# Patient Record
Sex: FEMALE | Race: WHITE | Employment: OTHER | URBAN - METROPOLITAN AREA
[De-identification: names, ages, dates, MRNs, and addresses within clinical notes are randomized per-mention and may not be internally consistent; named-entity substitution may affect disease eponyms.]

---

## 2018-03-28 ENCOUNTER — OFFICE VISIT (OUTPATIENT)
Dept: FAMILY MEDICINE CLINIC | Age: 63
End: 2018-03-28

## 2018-03-28 VITALS
WEIGHT: 149 LBS | HEART RATE: 56 BPM | HEIGHT: 65 IN | OXYGEN SATURATION: 97 % | TEMPERATURE: 97.2 F | RESPIRATION RATE: 16 BRPM | BODY MASS INDEX: 24.83 KG/M2 | DIASTOLIC BLOOD PRESSURE: 53 MMHG | SYSTOLIC BLOOD PRESSURE: 117 MMHG

## 2018-03-28 DIAGNOSIS — Z01.89 ROUTINE LAB DRAW: ICD-10-CM

## 2018-03-28 DIAGNOSIS — E78.5 HYPERLIPIDEMIA, UNSPECIFIED HYPERLIPIDEMIA TYPE: ICD-10-CM

## 2018-03-28 DIAGNOSIS — I10 ESSENTIAL HYPERTENSION: Primary | ICD-10-CM

## 2018-03-28 DIAGNOSIS — I73.9 PAD (PERIPHERAL ARTERY DISEASE) (HCC): ICD-10-CM

## 2018-03-28 DIAGNOSIS — E11.9 CONTROLLED TYPE 2 DIABETES MELLITUS WITHOUT COMPLICATION, WITHOUT LONG-TERM CURRENT USE OF INSULIN (HCC): ICD-10-CM

## 2018-03-28 DIAGNOSIS — I65.23 BILATERAL CAROTID ARTERY STENOSIS: ICD-10-CM

## 2018-03-28 DIAGNOSIS — M06.9 RHEUMATOID ARTHRITIS OF WRIST, UNSPECIFIED LATERALITY, UNSPECIFIED RHEUMATOID FACTOR PRESENCE: ICD-10-CM

## 2018-03-28 DIAGNOSIS — Z12.11 COLON CANCER SCREENING: ICD-10-CM

## 2018-03-28 DIAGNOSIS — Z95.828 S/P INSERTION OF ILIAC ARTERY STENT: ICD-10-CM

## 2018-03-28 DIAGNOSIS — H26.9 CATARACT OF BOTH EYES, UNSPECIFIED CATARACT TYPE: ICD-10-CM

## 2018-03-28 RX ORDER — PREDNISONE 10 MG/1
TABLET ORAL
COMMUNITY
End: 2018-03-28 | Stop reason: SDUPTHER

## 2018-03-28 RX ORDER — METHOTREXATE 2.5 MG/1
15 TABLET ORAL
COMMUNITY
End: 2018-03-28 | Stop reason: SDUPTHER

## 2018-03-28 RX ORDER — ATENOLOL 25 MG/1
25 TABLET ORAL DAILY
COMMUNITY
End: 2018-03-28

## 2018-03-28 RX ORDER — HYDROXYCHLOROQUINE SULFATE 200 MG/1
200 TABLET, FILM COATED ORAL 2 TIMES DAILY
Qty: 60 TAB | Refills: 2 | Status: SHIPPED | OUTPATIENT
Start: 2018-03-28 | End: 2018-06-27

## 2018-03-28 RX ORDER — HYDRALAZINE HYDROCHLORIDE 100 MG/1
100 TABLET, FILM COATED ORAL 3 TIMES DAILY
COMMUNITY
End: 2018-03-28 | Stop reason: SDUPTHER

## 2018-03-28 RX ORDER — ATORVASTATIN CALCIUM 20 MG/1
20 TABLET, FILM COATED ORAL 2 TIMES DAILY
COMMUNITY
End: 2018-04-02

## 2018-03-28 RX ORDER — HYDROXYCHLOROQUINE SULFATE 200 MG/1
200 TABLET, FILM COATED ORAL 2 TIMES DAILY
COMMUNITY
End: 2018-03-28 | Stop reason: SDUPTHER

## 2018-03-28 RX ORDER — AMLODIPINE BESYLATE 5 MG/1
5 TABLET ORAL DAILY
COMMUNITY
End: 2018-04-25 | Stop reason: SDUPTHER

## 2018-03-28 RX ORDER — HYDRALAZINE HYDROCHLORIDE 100 MG/1
100 TABLET, FILM COATED ORAL 3 TIMES DAILY
Qty: 90 TAB | Refills: 2 | Status: SHIPPED | OUTPATIENT
Start: 2018-03-28 | End: 2018-04-25 | Stop reason: SDUPTHER

## 2018-03-28 RX ORDER — BISMUTH SUBSALICYLATE 262 MG
1 TABLET,CHEWABLE ORAL DAILY
COMMUNITY
End: 2019-02-05

## 2018-03-28 RX ORDER — VERAPAMIL HYDROCHLORIDE 180 MG/1
180 TABLET, EXTENDED RELEASE ORAL
COMMUNITY
End: 2018-04-25 | Stop reason: SDUPTHER

## 2018-03-28 RX ORDER — METHOTREXATE 2.5 MG/1
15 TABLET ORAL
Qty: 24 TAB | Refills: 2 | Status: SHIPPED | OUTPATIENT
Start: 2018-04-01 | End: 2018-06-30

## 2018-03-28 RX ORDER — LISINOPRIL 20 MG/1
20 TABLET ORAL DAILY
COMMUNITY
End: 2018-04-25 | Stop reason: SDUPTHER

## 2018-03-28 RX ORDER — ASPIRIN 325 MG
325 TABLET ORAL DAILY
COMMUNITY
End: 2019-02-05

## 2018-03-28 RX ORDER — PREDNISONE 10 MG/1
10 TABLET ORAL
Qty: 30 TAB | Refills: 2 | Status: SHIPPED | OUTPATIENT
Start: 2018-03-28 | End: 2018-06-27

## 2018-03-28 NOTE — MR AVS SNAPSHOT
[address] [place] [address] [address] Anitra Winston [address] 
[phone] Patient: Annamarie Palacio MRN: [id number] :1955 Visit Information Date & Time Provider Department Dept. Phone Encounter #  
 3/28/2018 11:30 AM Erik Nguyen MD [address] [phone] [id number] Follow-up Instructions Return in about 4 weeks (around 2018), or if symptoms worsen or fail to improve, for HTN. Upcoming Health Maintenance Date Due Hepatitis C Screening 1955 COLONOSCOPY 10/19/1971 DTaP/Tdap/Td series (1 - Tdap) 10/19/1974 ZOSTER VACCINE AGE 60> 2013 BREAST CANCER SCRN MAMMOGRAM 2019 Allergies as of 3/28/2018  Review Complete On: 3/28/2018 By: Erik Nguyen MD  
  
 Severity Noted Reaction Type Reactions Hydrochlorothiazide  2018    Other (comments) LOW SODIUM Penicillin G  2018    Hives, Rash  
 Sulfa (Sulfonamide Antibiotics)  2018    Hives, Rash Current Immunizations  Never Reviewed No immunizations on file. Not reviewed this visit You Were Diagnosed With   
  
 Codes Comments Essential hypertension    -  Primary ICD-10-CM: I10 
ICD-9-CM: 401.9 Bilateral carotid artery stenosis     ICD-10-CM: I65.23 ICD-9-CM: 433.10, 433.30 PAD (peripheral artery disease) (HCC)     ICD-10-CM: I73.9 ICD-9-CM: 443.9 Colon cancer screening     ICD-10-CM: Z12.11 ICD-9-CM: V76.51 S/P insertion of iliac artery stent     ICD-10-CM: Z95.828 ICD-9-CM: V45.89 Rheumatoid arthritis of wrist, unspecified laterality, unspecified rheumatoid factor presence (HCC)     ICD-10-CM: M06.9 ICD-9-CM: 714.0 Hyperlipidemia, unspecified hyperlipidemia type     ICD-10-CM: E78.5 ICD-9-CM: 272.4 Cataract of both eyes, unspecified cataract type     ICD-10-CM: H26.9 ICD-9-CM: 366.9  Controlled type 2 diabetes mellitus without complication, without long-term current use of insulin (HCC)     ICD-10-CM: E11.9 ICD-9-CM: 250.00 Vitals BP Pulse Temp Resp Height(growth percentile) Weight(growth percentile) 117/53 (BP 1 Location: Right arm) (!) 56 97.2 °F (36.2 °C) 16 5' 5\" (1.651 m) 149 lb (67.6 kg) SpO2 BMI OB Status Smoking Status 97% 24.79 kg/m2 Postmenopausal Current Every Day Smoker Vitals History BMI and BSA Data Body Mass Index Body Surface Area 24.79 kg/m 2 1.76 m 2 Preferred Pharmacy Pharmacy Name Phone [facility] [address] [facility], [address] [phone] Your Updated Medication List  
  
   
This list is accurate as of 3/28/18 12:20 PM.  Always use your most recent med list. amLODIPine 5 mg tablet Commonly known as:  Larayne Sangeetha Take 5 mg by mouth daily. aspirin 325 mg tablet Commonly known as:  ASPIRIN Take 325 mg by mouth daily. atorvastatin 20 mg tablet Commonly known as:  LIPITOR Take 20 mg by mouth two (2) times a day. calcium carbonate-vitamin D3 600 mg (1,500 mg)-800 unit [place] Quarto Take  by mouth. FOLIC ACID PO Take 400 mcg by mouth. hydrALAZINE 100 mg tablet Commonly known as:  APRESOLINE Take 1 Tab by mouth three (3) times daily for 90 days. hydroxychloroquine 200 mg tablet Commonly known as:  PLAQUENIL Take 1 Tab by mouth two (2) times a day for 90 days. lisinopril 20 mg tablet Commonly known as:  Queen Cori Take 20 mg by mouth two (2) times a day. methotrexate 2.5 mg tablet Commonly known as:  Lewis Later Take 6 Tabs by mouth every Sunday for 90 days. Start taking on:  4/1/2018  
  
 multivitamin tablet Commonly known as:  ONE A DAY Take 1 Tab by mouth daily. predniSONE 10 mg tablet Commonly known as:  Lily Mellow Take 1 Tab by mouth daily as needed for up to 90 days. verapamil  mg CR tablet Commonly known as:  CALAN-SR  
 Take 180 mg by mouth two (2) times a day. zinc 50 mg Tab tablet Take  by mouth daily. Prescriptions Sent to Pharmacy Refills  
 hydroxychloroquine (PLAQUENIL) 200 mg tablet 2 Sig: Take 1 Tab by mouth two (2) times a day for 90 days. Class: Normal  
 Pharmacy: [address] #: [phone] Route: Oral  
 predniSONE (DELTASONE) 10 mg tablet 2 Sig: Take 1 Tab by mouth daily as needed for up to 90 days. Class: Normal  
 Pharmacy: [address] #: [phone] Route: Oral  
 methotrexate (RHEUMATREX) 2.5 mg tablet 2 Starting on: 2018 Sig: Take 6 Tabs by mouth every  for 90 days. Class: Normal  
 Pharmacy: [address] #: [phone] Route: Oral  
 hydrALAZINE (APRESOLINE) 100 mg tablet 2 Sig: Take 1 Tab by mouth three (3) times daily for 90 days. Class: Normal  
 Pharmacy: [address] #: [phone] Route: Oral  
  
We Performed the Following REFERRAL TO GASTROENTEROLOGY [XWA17 Custom] Comments:  
 Please evaluate patient for colon cancer screen with colonoscopy, fam hx adenoma polpys in daughter ONE Change. REFERRAL TO OPHTHALMOLOGY [REF57 Custom] Comments:  
 Please evaluate patient for bilateral cataracts ONE Change. REFERRAL TO RHEUMATOLOGY [ISY39 Custom] REFERRAL TO VASCULAR SURGERY [YPG965 Custom] Comments: S/p  and fem-pop bypass, stents. Please eval and give treatment recs ONE Change Follow-up Instructions Return in about 4 weeks (around 2018), or if symptoms worsen or fail to improve, for HTN. To-Do List   
 2018 Lab:  CBC WITH AUTOMATED DIFF   
  
 2018 Lab:  HEMOGLOBIN A1C WITH EAG   
  
 2018 Lab:  LIPID PANEL   
  
 2018 Lab: METABOLIC PANEL, COMPREHENSIVE   
  
 03/28/2018 Lab:  MICROALBUMIN, UR, RAND W/ MICROALB/CREAT RATIO Referral Information Referral ID Referred By Referred To  
  
 [id number] Rex Vallejo Not Available Visits Status Start Date End Date 1 New Request 3/28/18 3/28/19 If your referral has a status of pending review or denied, additional information will be sent to support the outcome of this decision. Referral ID Referred By Referred To  
 [id number] Rex Vallejo Not Available Visits Status Start Date End Date 1 New Request 3/28/18 3/28/19 If your referral has a status of pending review or denied, additional information will be sent to support the outcome of this decision. Referral ID Referred By Referred To  
 [id number] Rex Valeljo Not Available Visits Status Start Date End Date 1 New Request 3/28/18 3/28/19 If your referral has a status of pending review or denied, additional information will be sent to support the outcome of this decision. Referral ID Referred By Referred To  
 [id number] Rex Vallejo Not Available Visits Status Start Date End Date 1 New Request 3/28/18 3/28/19 If your referral has a status of pending review or denied, additional information will be sent to support the outcome of this decision. Patient Instructions Stop taking atenolol Start taking verapamil and lisinopril once a day in morning Continue hydralazine 100 mg three times a day, amlodipine 5 mg daily, Check BP mid afternoon, 2-3 PM after sitting for a few minutes, legs uncrossed, back resting Write numbers down an                                                                                                                                                                                                               d bring to clinic in 4 weeks. If persistently >140/>90 call clinic to discuss If persistently <120/<60 call clinic to discuss We are sending a referral to rheumatology, vascular surgery, eye doctor and gastroenterology for colon cancer screen . You should be called about this in 2-3 weeks. If no word in 3 weeks please give us a call to follow up We will let you know the results of your blood and urine test when they come in. We will call if abnormal and send a letter if normal. 
 
 
 
  
Introducing [facility]! [facility] introduces [employer] patient portal. Now you can access parts of your medical record, email your doctor's office, and request medication refills online. 1. In your internet browser, go to [email or web address] 2. Click on the First Time User? Click Here link in the Sign In box. You will see the New Member Sign Up page. 3. Enter your [employer] Access Code exactly as it appears below. You will not need to use this code after youve completed the sign-up process. If you do not sign up before the expiration date, you must request a new code. · [employer] Access Code: [email or web address] Expires: 6/26/2018 12:19 PM 
 
4. Enter the last four digits of your Social Security Number (xxxx) and Date of Birth (mm/dd/yyyy) as indicated and click Submit. You will be taken to the next sign-up page. 5. Create a [employer] ID. This will be your [employer] login ID and cannot be changed, so think of one that is secure and easy to remember. 6. Create a [employer] password. You can change your password at any time. 7. Enter your Password Reset Question and Answer. This can be used at a later time if you forget your password. 8. Enter your e-mail address. You will receive e-mail notification when new information is available in [address] 9. Click Sign Up. You can now view and download portions of your medical record. 10. Click the Download Summary menu link to download a portable copy of your medical information.  
 
If you have questions, please visit the Frequently Asked Questions section of the [employer]. Remember, [facility] is NOT to be used for urgent needs. For medical emergencies, dial 911. Now available from your iPhone and Android! Please provide this summary of care documentation to your next provider. If you have any questions after today's visit, please call [phone].

## 2018-03-28 NOTE — PATIENT INSTRUCTIONS
Stop taking atenolol  Start taking verapamil and lisinopril once a day in morning  Continue hydralazine 100 mg three times a day, amlodipine 5 mg daily,   Check BP mid afternoon, 2-3 PM after sitting for a few minutes, legs uncrossed, back resting  Write numbers down an                                                                                                                                                                                                               d bring to clinic in 4 weeks. If persistently >140/>90 call clinic to discuss  If persistently <120/<60 call clinic to discuss    We are sending a referral to rheumatology, vascular surgery, eye doctor and gastroenterology for colon cancer screen . You should be called about this in 2-3 weeks. If no word in 3 weeks please give us a call to follow up    We will let you know the results of your blood and urine test when they come in.  We will call if abnormal and send a letter if normal.

## 2018-03-28 NOTE — PROGRESS NOTES
INTERNAL MEDICINE INITIAL PROVIDER VISIT    SUBJECTIVE:     Chief Complaint   Patient presents with    Establish Care    Hypertension       HPI: 57 y.o. female with PMHx significant for CAD, HTN, DM and HLD is here for the above chief complaint(s). Establish Care: last PCP with [facility] in [facility]. Hypertension: Today BP is controlled. Taking atenolol 25 mg daily, hydralazine 100mg TID, varapamil  mg BID, amlodipine 5 mg daily, lisinopril 20mg BID. No side effects from medications. 20 lb weight loss in past few months. Ever once in a while feels lightheaded. Denies headache, lightheadedness, dizziness, vision changes, chest pain, rapid/irregular heart rate, shortness of breath, cough, abdominal pain, leg pain. Left leg minimal leg swelling. Does not check BP outside of office. Would like to decrease BP medicines. HLD: Atorvastatin 20 mg daily. Rheumatoid arthitis: ologist Dr. Youssef Falguni: Taking methotrexate 2.5 mg once a week and plaquenil 200 mg BID, prednisone as needed 10 mg daily. PAD s/p  and fem/pop bypass and iliac stent, femoral stent, popliteal stents in past. Left foot numb since femoral bypass. Anxiety: Taking xanax 0.25 mg as needed hasn't taken in months., last filled 2017    ROS: 12 point ROS completed positive per HPI and easy bruising. Current Outpatient Prescriptions   Medication Sig    amLODIPine (NORVASC) 5 mg tablet Take 5 mg by mouth daily.  atorvastatin (LIPITOR) 20 mg tablet Take 20 mg by mouth two (2) times a day.  verapamil ER (CALAN-SR) 180 mg CR tablet Take 180 mg by mouth two (2) times a day.  lisinopril (PRINIVIL, ZESTRIL) 20 mg tablet Take 20 mg by mouth two (2) times a day.  zinc 50 mg tab tablet Take  by mouth daily.  FOLIC ACID PO Take 539 mcg by mouth.  multivitamin (ONE A DAY) tablet Take 1 Tab by mouth daily.  aspirin (ASPIRIN) 325 mg tablet Take 325 mg by mouth daily.     calcium carbonate-vitamin D3 600 mg (1,500 mg)-800 unit chew Take  by mouth.  hydroxychloroquine (PLAQUENIL) 200 mg tablet Take 1 Tab by mouth two (2) times a day for 90 days.  predniSONE (DELTASONE) 10 mg tablet Take 1 Tab by mouth daily as needed for up to 90 days.  [START ON 2018] methotrexate (RHEUMATREX) 2.5 mg tablet Take 6 Tabs by mouth every  for 90 days.  hydrALAZINE (APRESOLINE) 100 mg tablet Take 1 Tab by mouth three (3) times daily for 90 days. No current facility-administered medications for this visit. Health Maintenance   Topic Date Due    Hepatitis C Screening  1955    COLONOSCOPY  10/19/1971    DTaP/Tdap/Td series (1 - Tdap) 10/19/1974    ZOSTER VACCINE AGE 60>  2013    BREAST CANCER SCRN MAMMOGRAM  2019    Influenza Age 9 to Adult  Completed       Medications and Allergies: Reviewed and confirmed in the chart    Past Medical Hx: Reviewed and confirmed in the chart  Past Medical History:   Diagnosis Date    Arthritis     Carotid artery stenosis       LEFT ARTERY    Cataract     Diabetes type 2, controlled ([address]     HLD (hyperlipidemia)     Hypertension     PAD (peripheral artery disease) ([address]     FEM--POP BYPASS  LEFT FEM BYPASS RIGHT FEMORAL ENDARTERECTOMY, LEFT FEM STENT      Rheumatoid arthritis ([address]     S/P insertion of iliac artery stent 2016       Family Hx, Surgical Hx, Social Hx: Reviewed and updated in EMR    OBJECTIVE:  Vitals:    18 1136   BP: 117/53   Pulse: (!) 56   Resp: 16   Temp: 97.2 °F (36.2 °C)   SpO2: 97%   Weight: 149 lb (67.6 kg)   Height: 5' 5\" (1.651 m)       BP Readings from Last 3 Encounters:   18 117/53     Wt Readings from Last 3 Encounters:   18 149 lb (67.6 kg)       General: Pleasant elderly woman in no acute distress  HEENT: Head is atraumatic normo-cephalic. Neck: Supple, no LAD  CVS: + carotid bruit right, no bruit on left. Heart regular, rate, and rhythm. Audible S1 and S2.  No murmurs, rubs or gallops. PULM: Lungs clear to auscultation bilaterally. No wheezes, rales or rhonchi. GI: Positive bowel sounds, soft, nondistended, non-tender. EXT: 2+ dorsalis pedis and posterior tibialis pulses bilaterally. No pedal edema bilaterally  Neuro: Alert and oriented x3. Gait wnl. .   MSE: Mood euthymic, affect congruent and reactive. Nursing Notes Reviewed    ASSESSMENT AND PLAN    ICD-10-CM ICD-9-CM    1. Essential hypertension I10 401.9 hydrALAZINE (APRESOLINE) 100 mg tablet  D/c atenolol  BP log  Dash diet   2. Bilateral carotid artery stenosis I65.23 433.10 Vascular referral  NAE for past MD     433.30    3. PAD (peripheral artery disease) ([facility]) I73.9 443.9 REFERRAL TO VASCULAR SURGERY      LIPID PANEL      CBC WITH AUTOMATED DIFF   4. Colon cancer screening Z12.11 V76.51 REFERRAL TO GASTROENTEROLOGY   5. S/P insertion of iliac artery stent V26.794 V45.89 vascular referral   6. Rheumatoid arthritis of wrist, unspecified laterality, unspecified rheumatoid factor presence ([facility]) M06.9 714.0 REFERRAL TO OPHTHALMOLOGY  nae signed for past rheumatologist      REFERRAL TO RHEUMATOLOGY      CBC WITH AUTOMATED DIFF      hydroxychloroquine (PLAQUENIL) 200 mg tablet      predniSONE (DELTASONE) 10 mg tablet      methotrexate (RHEUMATREX) 2.5 mg tablet 15 mg every Sunday continue   7. Hyperlipidemia, unspecified hyperlipidemia type E78.5 272.4 LIPID PANEL   8. Cataract of both eyes, unspecified cataract type H26.9 366.9 REFERRAL TO OPHTHALMOLOGY   9.  Controlled type 2 diabetes mellitus without complication, without long-term current use of insulin ([facility]) diet controlled E11.9 250.00 HEMOGLOBIN A1C WITH EAG        METABOLIC PANEL, COMPREHENSIVE      CBC WITH AUTOMATED DIFF      MICROALBUMIN, UR, RAND W/ MICROALB/CREAT RATIO       Orders Placed This Encounter    HEMOGLOBIN A1C WITH EAG    METABOLIC PANEL, COMPREHENSIVE    LIPID PANEL    CBC WITH AUTOMATED DIFF    MICROALBUMIN, UR, RAND W/ MICROALB/CREAT RATIO    REFERRAL TO GASTROENTEROLOGY    REFERRAL TO OPHTHALMOLOGY    REFERRAL TO RHEUMATOLOGY    REFERRAL TO VASCULAR SURGERY    amLODIPine (NORVASC) 5 mg tablet    DISCONTD: hydrALAZINE (APRESOLINE) 100 mg tablet    atorvastatin (LIPITOR) 20 mg tablet    DISCONTD: hydroxychloroquine (PLAQUENIL) 200 mg tablet    verapamil ER (CALAN-SR) 180 mg CR tablet    lisinopril (PRINIVIL, ZESTRIL) 20 mg tablet    DISCONTD: predniSONE (DELTASONE) 10 mg tablet    DISCONTD: methotrexate (RHEUMATREX) 2.5 mg tablet    DISCONTD: atenolol (TENORMIN) 25 mg tablet    zinc 50 mg tab tablet    FOLIC ACID PO    multivitamin (ONE A DAY) tablet    aspirin (ASPIRIN) 325 mg tablet    calcium carbonate-vitamin D3 600 mg (1,500 mg)-800 unit chew    hydroxychloroquine (PLAQUENIL) 200 mg tablet    predniSONE (DELTASONE) 10 mg tablet    methotrexate (RHEUMATREX) 2.5 mg tablet    hydrALAZINE (APRESOLINE) 100 mg tablet     4 WEEKS    AVS printed and provided to patient    Assessment and plan above discussed with patient, patient voiced understanding and agreement with plan. More than 50% of this 30 min visit was spent face to face counseling the patient about the etiology and treatment options for the above health conditions outlined in assessment and plan       Brandt Vale M.D.   [employer]  [address] [place] [address], [address] [place] [address] [phone]  [place] [phone]

## 2018-03-28 NOTE — PROGRESS NOTES
Chief Complaint   Patient presents with    Establish Care    Hypertension     1. Have you been to the ER, urgent care clinic since your last visit? Hospitalized since your last visit? No    2. Have you seen or consulted any other health care providers outside of the [address] [place] [address] since your last visit? Include any pap smears or colon screening.  No

## 2018-03-29 LAB
A-G RATIO,AGRAT: 2 RATIO (ref 1.1–2.6)
ABSOLUTE LYMPHOCYTE COUNT, 10803: 0.9 K/UL (ref 1–4.8)
ALBUMIN SERPL-MCNC: 4.3 G/DL (ref 3.5–5)
ALP SERPL-CCNC: 60 U/L (ref 40–120)
ALT SERPL-CCNC: 22 U/L (ref 5–40)
ANION GAP SERPL CALC-SCNC: 17 MMOL/L
AST SERPL W P-5'-P-CCNC: 27 U/L (ref 10–37)
AVG GLU, 10930: 92 MG/DL (ref 91–123)
BASOPHILS # BLD: 0 K/UL (ref 0–0.2)
BASOPHILS NFR BLD: 0 % (ref 0–2)
BILIRUB SERPL-MCNC: 1 MG/DL (ref 0.2–1.2)
BUN SERPL-MCNC: 11 MG/DL (ref 6–22)
CALCIUM SERPL-MCNC: 9.3 MG/DL (ref 8.4–10.5)
CHLORIDE SERPL-SCNC: 90 MMOL/L (ref 98–110)
CHOLEST SERPL-MCNC: 159 MG/DL (ref 110–200)
CO2 SERPL-SCNC: 24 MMOL/L (ref 20–32)
CREAT SERPL-MCNC: 0.7 MG/DL (ref 0.8–1.4)
CREATININE, URINE: 28 MG/DL
EOSINOPHIL # BLD: 0 K/UL (ref 0–0.5)
EOSINOPHIL NFR BLD: 0 % (ref 0–6)
ERYTHROCYTE [DISTWIDTH] IN BLOOD BY AUTOMATED COUNT: 15 % (ref 10–15.5)
GFRAA, 66117: >60
GFRNA, 66118: >60
GLOBULIN,GLOB: 2.1 G/DL (ref 2–4)
GLUCOSE SERPL-MCNC: 74 MG/DL (ref 70–99)
GRANULOCYTES,GRANS: 75 % (ref 40–75)
HBA1C MFR BLD HPLC: 4.8 % (ref 4.8–5.9)
HCT VFR BLD AUTO: 35.5 % (ref 35.1–48)
HDLC SERPL-MCNC: 1.6 MG/DL (ref 0–5)
HDLC SERPL-MCNC: 97 MG/DL (ref 40–59)
HGB BLD-MCNC: 11.9 G/DL (ref 11.7–16)
LDLC SERPL CALC-MCNC: 36 MG/DL (ref 50–99)
LYMPHOCYTES, LYMLT: 18 % (ref 20–45)
MCH RBC QN AUTO: 36 PG (ref 26–34)
MCHC RBC AUTO-ENTMCNC: 34 G/DL (ref 31–36)
MCV RBC AUTO: 106 FL (ref 80–95)
MICROALB/CREAT RATIO, 140286: NORMAL MCG/MG OF CREATININE (ref 0–30)
MICROALBUMIN,URINE RANDOM 140054: <12 UG/ML (ref 0.1–17)
MONOCYTES # BLD: 0.3 K/UL (ref 0.1–1)
MONOCYTES NFR BLD: 7 % (ref 3–12)
NEUTROPHILS # BLD AUTO: 3.7 K/UL (ref 1.8–7.7)
PLATELET # BLD AUTO: 171 K/UL (ref 140–440)
PMV BLD AUTO: 9.8 FL (ref 9–13)
POTASSIUM SERPL-SCNC: 3.8 MMOL/L (ref 3.5–5.5)
PROT SERPL-MCNC: 6.4 G/DL (ref 6.2–8.1)
RBC # BLD AUTO: 3.34 M/UL (ref 3.8–5.2)
SODIUM SERPL-SCNC: 131 MMOL/L (ref 133–145)
TRIGL SERPL-MCNC: 128 MG/DL (ref 40–149)
VLDLC SERPL CALC-MCNC: 26 MG/DL (ref 8–30)
WBC # BLD AUTO: 5 K/UL (ref 4–11)

## 2018-04-02 ENCOUNTER — TELEPHONE (OUTPATIENT)
Dept: FAMILY MEDICINE CLINIC | Age: 63
End: 2018-04-02

## 2018-04-02 DIAGNOSIS — E87.1 HYPONATREMIA: ICD-10-CM

## 2018-04-02 DIAGNOSIS — E87.8 HYPOCHLOREMIA: ICD-10-CM

## 2018-04-02 DIAGNOSIS — E78.5 HYPERLIPIDEMIA, UNSPECIFIED HYPERLIPIDEMIA TYPE: Primary | ICD-10-CM

## 2018-04-02 RX ORDER — ATORVASTATIN CALCIUM 10 MG/1
10 TABLET, FILM COATED ORAL DAILY
Qty: 30 TAB | Refills: 2 | Status: SHIPPED | OUTPATIENT
Start: 2018-04-02 | End: 2018-04-25 | Stop reason: SDUPTHER

## 2018-04-02 NOTE — TELEPHONE ENCOUNTER
Called to discuss lab results. Low Sodium/Low chloride: 50-60 ounces per day water. 1 cup of coffee per day. No tremors, restlessness, vomiting or diarrhea. No increased or change in urination. Increase water 80 ounces per day. Will get urine and serum testing for further evaluation. LDL 36. Decrease your atorvastatin to 10 mg daily,  Recheck lipids in 3 months. Agueda Crouch M.D.   [address], [address] [place] [address] [phone]  [place] [phone]

## 2018-04-03 ENCOUNTER — LAB ONLY (OUTPATIENT)
Dept: FAMILY MEDICINE CLINIC | Age: 63
End: 2018-04-03

## 2018-04-03 DIAGNOSIS — Z01.89 ROUTINE LAB DRAW: Primary | ICD-10-CM

## 2018-04-03 NOTE — PROGRESS NOTES
1 lav and 1 sst along with urine collected today. Labs will be sent to [facility].

## 2018-04-03 NOTE — TELEPHONE ENCOUNTER
Patient was here today and urine along with blood work collected at time of labs. All labs will be sentt to [facility] based on patient's insurance. 1 urine cup, 1 lav and 1sst drawn.

## 2018-04-04 LAB
ANION GAP SERPL CALC-SCNC: 18 MMOL/L
AVG GLU, 10930: 91 MG/DL (ref 91–123)
BUN SERPL-MCNC: 8 MG/DL (ref 6–22)
CALCIUM SERPL-MCNC: 9.3 MG/DL (ref 8.4–10.5)
CHLORIDE SERPL-SCNC: 93 MMOL/L (ref 98–110)
CO2 SERPL-SCNC: 23 MMOL/L (ref 20–32)
CREAT SERPL-MCNC: 0.7 MG/DL (ref 0.8–1.4)
GFRAA, 66117: >60
GFRNA, 66118: >60
GLUCOSE SERPL-MCNC: 81 MG/DL (ref 70–99)
HBA1C MFR BLD HPLC: 4.8 % (ref 4.8–5.9)
Lab: 265 MOS/KG (ref 280–300)
Lab: 268 MOS/KG (ref 200–1200)
Lab: 68 MMOL/L
POTASSIUM SERPL-SCNC: 3.9 MMOL/L (ref 3.5–5.5)
SODIUM SERPL-SCNC: 134 MMOL/L (ref 133–145)
T4 FREE SERPL-MCNC: 1.2 NG/DL (ref 0.9–1.8)
TSH SERPL DL<=0.005 MIU/L-ACNC: 1.3 MCU/ML (ref 0.27–4.2)

## 2018-04-06 ENCOUNTER — TELEPHONE (OUTPATIENT)
Dept: FAMILY MEDICINE CLINIC | Age: 63
End: 2018-04-06

## 2018-04-06 PROBLEM — E87.1 HYPONATREMIA: Status: RESOLVED | Noted: 2018-04-02 | Resolved: 2018-04-06

## 2018-04-06 NOTE — TELEPHONE ENCOUNTER
Results of testing significant for resolved low sodium, improved low chloride. Serum osm low. Suspect poor nutrition food and water intake with A1c 4.8. Pt reports increased water intake. 1 meal a day usually lunch, 1 protein drink every morning. Low appetite since moving to [place]. Lost 22 lb since November 2017. Home sick, difficulty with [facility]. Will discuss next visit. Torsten Ro M.D.   [address], [address] [place] [address], [address] [place] [address] [phone]  [facility] [phone]

## 2018-04-25 ENCOUNTER — OFFICE VISIT (OUTPATIENT)
Dept: FAMILY MEDICINE CLINIC | Age: 63
End: 2018-04-25

## 2018-04-25 VITALS
SYSTOLIC BLOOD PRESSURE: 133 MMHG | RESPIRATION RATE: 16 BRPM | TEMPERATURE: 97.9 F | DIASTOLIC BLOOD PRESSURE: 54 MMHG | HEIGHT: 65 IN | WEIGHT: 147.4 LBS | BODY MASS INDEX: 24.56 KG/M2 | HEART RATE: 79 BPM | OXYGEN SATURATION: 99 %

## 2018-04-25 DIAGNOSIS — H26.9 CATARACT OF BOTH EYES, UNSPECIFIED CATARACT TYPE: ICD-10-CM

## 2018-04-25 DIAGNOSIS — E78.5 HYPERLIPIDEMIA, UNSPECIFIED HYPERLIPIDEMIA TYPE: ICD-10-CM

## 2018-04-25 DIAGNOSIS — I73.9 PAD (PERIPHERAL ARTERY DISEASE) (HCC): ICD-10-CM

## 2018-04-25 DIAGNOSIS — M06.9 RHEUMATOID ARTHRITIS OF WRIST, UNSPECIFIED LATERALITY, UNSPECIFIED RHEUMATOID FACTOR PRESENCE: ICD-10-CM

## 2018-04-25 DIAGNOSIS — E11.42 DIABETIC POLYNEUROPATHY ASSOCIATED WITH TYPE 2 DIABETES MELLITUS (HCC): ICD-10-CM

## 2018-04-25 DIAGNOSIS — I65.23 BILATERAL CAROTID ARTERY STENOSIS: ICD-10-CM

## 2018-04-25 DIAGNOSIS — E46 MALNUTRITION, UNSPECIFIED TYPE (HCC): ICD-10-CM

## 2018-04-25 DIAGNOSIS — E11.9 CONTROLLED TYPE 2 DIABETES MELLITUS WITHOUT COMPLICATION, WITHOUT LONG-TERM CURRENT USE OF INSULIN (HCC): ICD-10-CM

## 2018-04-25 DIAGNOSIS — I10 ESSENTIAL HYPERTENSION: Primary | ICD-10-CM

## 2018-04-25 RX ORDER — ATORVASTATIN CALCIUM 10 MG/1
10 TABLET, FILM COATED ORAL DAILY
Qty: 30 TAB | Refills: 2 | Status: SHIPPED | OUTPATIENT
Start: 2018-04-25 | End: 2018-09-16 | Stop reason: SDUPTHER

## 2018-04-25 RX ORDER — LISINOPRIL 20 MG/1
20 TABLET ORAL DAILY
Qty: 30 TAB | Refills: 5 | Status: SHIPPED | OUTPATIENT
Start: 2018-04-25 | End: 2018-09-20 | Stop reason: SDUPTHER

## 2018-04-25 RX ORDER — HYDRALAZINE HYDROCHLORIDE 100 MG/1
100 TABLET, FILM COATED ORAL 3 TIMES DAILY
Qty: 90 TAB | Refills: 2 | Status: SHIPPED | OUTPATIENT
Start: 2018-06-25 | End: 2018-09-16 | Stop reason: SDUPTHER

## 2018-04-25 RX ORDER — AMLODIPINE BESYLATE 5 MG/1
5 TABLET ORAL DAILY
Qty: 30 TAB | Refills: 5 | Status: SHIPPED | OUTPATIENT
Start: 2018-04-25 | End: 2018-09-20 | Stop reason: SDUPTHER

## 2018-04-25 RX ORDER — VERAPAMIL HYDROCHLORIDE 180 MG/1
180 TABLET, EXTENDED RELEASE ORAL DAILY
Qty: 30 TAB | Refills: 5 | Status: SHIPPED | OUTPATIENT
Start: 2018-04-25 | End: 2018-09-20 | Stop reason: SDUPTHER

## 2018-04-25 NOTE — PROGRESS NOTES
Internal Medicine Follow Up Visit Note    Chief Complaint   Patient presents with    Hypertension       HPI:  Jaguar Downey is a 57 y.o.  female with history significant for RA, HTN, DM diet controlled is here for the above complaint(s). Hypertension: Today BP is controlled. Taking verapamin 180 CR qAM, lisinopril 20 qAM, amlodipine 5 mg qAM and hydralazine 100 mg TID. No side effects from medications. Medication good compliance, last taken this AM. Denies headache, lightheadedness, dizziness, vision changes, chest pain, rapid/irregular heart rate, shortness of breath, cough, abdominal pain, leg swelling, new leg pain. Monitoring salt in diet, exercise includes walking sometimes. Does checks BP outside of office with readings averaging 130s-140s/50s-70s. Poor nutrition: Eating 2 meals a day 1 protein drink. Water intake 60 ounces per day. Lost 2 lb since last visit, down 22 lb since November 2017. Low appetite since move to [place] end of November. For past 2 weeks reports trouble staying asleep because of nocturia and trouble worrying about things too much several days, chronic per patient. No sadness, hopelessness or depressed mood. Has appointments scheduled for ophthalmology, rheumatology, gastroenterology and vascular in next month or so. ROS: as per HPI    Current Outpatient Prescriptions   Medication Sig    verapamil ER (CALAN-SR) 180 mg CR tablet Take 1 Tab by mouth daily for 180 days.  amLODIPine (NORVASC) 5 mg tablet Take 1 Tab by mouth daily for 180 days.  lisinopril (PRINIVIL, ZESTRIL) 20 mg tablet Take 1 Tab by mouth daily for 180 days.  atorvastatin (LIPITOR) 10 mg tablet Take 1 Tab by mouth daily for 90 days.  [START ON 6/25/2018] hydrALAZINE (APRESOLINE) 100 mg tablet Take 1 Tab by mouth three (3) times daily for 90 days.  zinc 50 mg tab tablet Take  by mouth daily.  FOLIC ACID PO Take 205 mcg by mouth.     multivitamin (ONE A DAY) tablet Take 1 Tab by mouth daily.  aspirin (ASPIRIN) 325 mg tablet Take 325 mg by mouth daily.  calcium carbonate-vitamin D3 600 mg (1,500 mg)-800 unit chew Take  by mouth.  hydroxychloroquine (PLAQUENIL) 200 mg tablet Take 1 Tab by mouth two (2) times a day for 90 days.  methotrexate (RHEUMATREX) 2.5 mg tablet Take 6 Tabs by mouth every  for 90 days.  predniSONE (DELTASONE) 10 mg tablet Take 1 Tab by mouth daily as needed for up to 90 days. No current facility-administered medications for this visit. Health Maintenance   Topic Date Due    FOOT EXAM Q1  10/19/1963    EYE EXAM RETINAL OR DILATED Q1  10/19/1963    COLONOSCOPY  10/19/1971    Pneumococcal 19-64 Medium Risk (1 of 1 - PPSV23) 10/19/1972    DTaP/Tdap/Td series (1 - Tdap) 10/19/1974    ZOSTER VACCINE AGE 60>  2013    HEMOGLOBIN A1C Q6M  10/03/2018    MICROALBUMIN Q1  2019    LIPID PANEL Q1  2019    BREAST CANCER SCRN MAMMOGRAM  2019    Hepatitis C Screening  Completed    Influenza Age 5 to Adult  Completed       There is no immunization history on file for this patient. Allergies and Medications: Reviewed and updated in EMR. Past Medical History:   Diagnosis Date    Arthritis     Carotid artery stenosis       LEFT ARTERY    Cataract     Diabetes type 2, controlled ([address]     HLD (hyperlipidemia)     Hypertension     PAD (peripheral artery disease) ([address]     FEM--POP BYPASS  LEFT FEM BYPASS RIGHT FEMORAL ENDARTERECTOMY, LEFT FEM STENT      Rheumatoid arthritis [facility]     S/P insertion of iliac artery stent 2016       Family History, Social History, Past Medical History, Surgical History: Reviewed and updated in EMR as appropriate.       OBJECTIVE:   Visit Vitals    /54    Pulse 79    Temp 97.9 °F (36.6 °C) (Oral)    Resp 16    Ht 5' 5\" (1.651 m)    Wt 147 lb 6.4 oz (66.9 kg)    SpO2 99%    BMI 24.53 kg/m2        BP Readings from Last 3 Encounters:   18 133/54 03/28/18 117/53     Wt Readings from Last 3 Encounters:   04/25/18 147 lb 6.4 oz (66.9 kg)   03/28/18 149 lb (67.6 kg)       General: Pleasant late middle aged woman sitting comfortably in no acute distress  HEENT: Head is atraumatic normo-cephalic. EXT: No pedal edema bilaterally  Neuro: Alert and oriented x3. MSE: mood euthymic, affect congruent and reactive. Diabetic Foot exam: 2+ dorsalis pedis pulses left, 1+ DP pulse right. 2+ PT pulses bilaterally. Positive monofilament in all  toes and bottoms right foot. Absent monofiliment sensation left foot. Vibratory sense intact bilateral MTP joints R>L. Joint poisition sense intact bilateral first digits. Skin negative for ulcerative/plaque lesions, signs of infection, or other visual foot abnormalities. Positive onychomycosis. PHQ-9: 1, not difficult at all  PARAM-7: 1, not difficult at all  Nursing Notes Reviewed. LABS/RADIOLOGICAL TESTS:      Lab Results   Component Value Date/Time    WBC 5.0 03/28/2018 10:00 AM    HGB 11.9 03/28/2018 10:00 AM    HCT 35.5 03/28/2018 10:00 AM    PLATELET 934 88/99/2557 10:00 AM     Lab Results   Component Value Date/Time    Sodium 134 04/03/2018 10:20 AM    Potassium 3.9 04/03/2018 10:20 AM    Chloride 93 (L) 04/03/2018 10:20 AM    CO2 23 04/03/2018 10:20 AM    Glucose 81 04/03/2018 10:20 AM    BUN 8 04/03/2018 10:20 AM    Creatinine 0.7 (L) 04/03/2018 10:20 AM     Lab Results   Component Value Date/Time    Cholesterol, total 159 03/28/2018 10:00 AM    HDL Cholesterol 97 (H) 03/28/2018 10:00 AM    LDL, calculated 36 (L) 03/28/2018 10:00 AM    Triglyceride 128 03/28/2018 10:00 AM       All lab results and radiological studies were reviewed and discussed with the patient. ASSESSMENT/PLAN:      ICD-10-CM ICD-9-CM    1.  Essential hypertension I10 401.9 verapamil ER (CALAN-SR) 180 mg CR tablet      amLODIPine (NORVASC) 5 mg tablet      lisinopril (PRINIVIL, ZESTRIL) 20 mg tablet      hydrALAZINE (APRESOLINE) 100 mg tablet  Dash diet  Water  exercise   2. Malnutrition, unspecified type ([facility] E46 263.9 Discussed healthy diet  RTC in 6 weeks   3. Controlled type 2 diabetes mellitus without complication, without long-term current use of insulin ([facility]) E11.9 250.00 Continue diet, exercise   4. Hyperlipidemia, unspecified hyperlipidemia type E78.5 272.4 atorvastatin (LIPITOR) 10 mg tablet   5. Rheumatoid arthritis of wrist, unspecified laterality, unspecified rheumatoid factor presence ([facility]) M06.9 714.0 F/u with specialist   6. PAD (peripheral artery disease) ([facility]) I73.9 443.9 F/u with specialist   7. Bilateral carotid artery stenosis I65.23 433.10 F/u with specialist     433.30    8. Cataract of both eyes, unspecified cataract type H26.9 366.9 F/u with specialist   9. Diabetic polyneuropathy associated with type 2 diabetes mellitus ([facility]) E11.42 250.60 Rx given for diabetic shoes     357.2        Requested Prescriptions     Signed Prescriptions Disp Refills    verapamil ER (CALAN-SR) 180 mg CR tablet 30 Tab 5     Sig: Take 1 Tab by mouth daily for 180 days.  amLODIPine (NORVASC) 5 mg tablet 30 Tab 5     Sig: Take 1 Tab by mouth daily for 180 days.  lisinopril (PRINIVIL, ZESTRIL) 20 mg tablet 30 Tab 5     Sig: Take 1 Tab by mouth daily for 180 days.  atorvastatin (LIPITOR) 10 mg tablet 30 Tab 2     Sig: Take 1 Tab by mouth daily for 90 days.  hydrALAZINE (APRESOLINE) 100 mg tablet 90 Tab 2     Sig: Take 1 Tab by mouth three (3) times daily for 90 days. Patient verbalized understanding and agreement with the plan. Patient was given an after-visit summary. Follow-up Disposition:  Return in about 6 weeks (around 6/6/2018), or if symptoms worsen or fail to improve, for weight loss, anxiety. or sooner if worsening symptoms. More than 50% of this 25 min visit was spent counseling the patient face to face about etiology and treatment of health conditions outlined in assessment and plan.         Dayday Borjas M.D.  [address], [facility] [place], [address] [phone]  [place] [phone]

## 2018-04-25 NOTE — MR AVS SNAPSHOT
[address] [address] 
[phone] Patient: Precious Ramírez MRN: [id number] :1955 Visit Information Date & Time Provider Department Dept. Phone Encounter #  
 2018  9:30 AM Sandie Hernandez MD [address] [phone] [id number] Follow-up Instructions Return in about 6 weeks (around 2018), or if symptoms worsen or fail to improve, for weight loss, anxiety. Upcoming Health Maintenance Date Due  
 FOOT EXAM Q1 10/19/1963 EYE EXAM RETINAL OR DILATED Q1 10/19/1963 COLONOSCOPY 10/19/1971 Pneumococcal 19-64 Medium Risk (1 of 1 - PPSV23) 10/19/1972 DTaP/Tdap/Td series (1 - Tdap) 10/19/1974 ZOSTER VACCINE AGE 60> 2013 HEMOGLOBIN A1C Q6M 10/3/2018 MICROALBUMIN Q1 3/28/2019 LIPID PANEL Q1 3/28/2019 BREAST CANCER SCRN MAMMOGRAM 2019 Allergies as of 2018  Review Complete On: 2018 By: Sandie Hernandez MD  
  
 Severity Noted Reaction Type Reactions Hydrochlorothiazide  2018    Other (comments) LOW SODIUM Penicillin G  2018    Hives, Rash  
 Sulfa (Sulfonamide Antibiotics)  2018    Hives, Rash Current Immunizations  Never Reviewed No immunizations on file. Not reviewed this visit You Were Diagnosed With   
  
 Codes Comments Essential hypertension    -  Primary ICD-10-CM: I10 
ICD-9-CM: 401.9 Malnutrition, unspecified type ([address]     ICD-10-CM: E46 
ICD-9-CM: 263.9 Controlled type 2 diabetes mellitus without complication, without long-term current use of insulin ([facility]     ICD-10-CM: E11.9 ICD-9-CM: 250.00 Hyperlipidemia, unspecified hyperlipidemia type     ICD-10-CM: E78.5 ICD-9-CM: 272.4 Rheumatoid arthritis of wrist, unspecified laterality, unspecified rheumatoid factor presence (HCC)     ICD-10-CM: M06.9 ICD-9-CM: 714.0 PAD (peripheral artery disease) ([facility])     ICD-10-CM: I73.9 ICD-9-CM: 443.9 Bilateral carotid artery stenosis     ICD-10-CM: I65.23 ICD-9-CM: 433.10, 433.30 Cataract of both eyes, unspecified cataract type     ICD-10-CM: H26.9 ICD-9-CM: 366.9 Diabetic polyneuropathy associated with type 2 diabetes mellitus ([facility]     ICD-10-CM: E11.42 
ICD-9-CM: 250.60, 357.2 Vitals BP Pulse Temp Resp Height(growth percentile) Weight(growth percentile) 133/54 79 97.9 °F (36.6 °C) (Oral) 16 5' 5\" (1.651 m) 147 lb 6.4 oz (66.9 kg) SpO2 BMI OB Status Smoking Status 99% 24.53 kg/m2 Postmenopausal Current Every Day Smoker BMI and BSA Data Body Mass Index Body Surface Area 24.53 kg/m 2 1.75 m 2 Preferred Pharmacy Pharmacy Name Phone [address] [phone] Your Updated Medication List  
  
   
This list is accurate as of 4/25/18 10:44 AM.  Always use your most recent med list. amLODIPine 5 mg tablet Commonly known as:  [place] Take 1 Tab by mouth daily for 180 days. aspirin 325 mg tablet Commonly known as:  ASPIRIN Take 325 mg by mouth daily. atorvastatin 10 mg tablet Commonly known as:  LIPITOR Take 1 Tab by mouth daily for 90 days. calcium carbonate-vitamin D3 600 mg (1,500 mg)-800 unit Margarito Dearani Take  by mouth. FOLIC ACID PO Take 400 mcg by mouth. hydrALAZINE 100 mg tablet Commonly known as:  APRESOLINE Take 1 Tab by mouth three (3) times daily for 90 days. Start taking on:  6/25/2018  
  
 hydroxychloroquine 200 mg tablet Commonly known as:  PLAQUENIL Take 1 Tab by mouth two (2) times a day for 90 days. lisinopril 20 mg tablet Commonly known as:  Saniya Husbands Take 1 Tab by mouth daily for 180 days. methotrexate 2.5 mg tablet Commonly known as:  Patrisha Ty Take 6 Tabs by mouth every Sunday for 90 days. multivitamin tablet Commonly known as:  ONE A DAY Take 1 Tab by mouth daily. predniSONE 10 mg tablet Commonly known as:  Daniel Doheny Take 1 Tab by mouth daily as needed for up to 90 days. verapamil  mg CR tablet Commonly known as:  CALAN-SR Take 1 Tab by mouth daily for 180 days. zinc 50 mg Tab tablet Take  by mouth daily. Prescriptions Sent to Pharmacy Refills  
 verapamil ER (CALAN-SR) 180 mg CR tablet 5 Sig: Take 1 Tab by mouth daily for 180 days. Class: Normal  
 Pharmacy: [address] #: [phone] Route: Oral  
 amLODIPine (NORVASC) 5 mg tablet 5 Sig: Take 1 Tab by mouth daily for 180 days. Class: Normal  
 Pharmacy: [address] #: [phone] Route: Oral  
 lisinopril (PRINIVIL, ZESTRIL) 20 mg tablet 5 Sig: Take 1 Tab by mouth daily for 180 days. Class: Normal  
 Pharmacy: [address] #: [phone] Route: Oral  
 atorvastatin (LIPITOR) 10 mg tablet 2 Sig: Take 1 Tab by mouth daily for 90 days. Class: Normal  
 Pharmacy: [address] #: [phone] Route: Oral  
 hydrALAZINE (APRESOLINE) 100 mg tablet 2 Starting on: 6/25/2018 Sig: Take 1 Tab by mouth three (3) times daily for 90 days. Class: Normal  
 Pharmacy: [address] #: [phone] Route: Oral  
  
Follow-up Instructions Return in about 6 weeks (around 6/6/2018), or if symptoms worsen or fail to improve, for weight loss, anxiety. Patient Instructions Continue taking medications as prescribed No need to check Blood Pressure Ask specialist to send note from visit to Dr. Roge Faith office after completion Return in 6 weeks to follow up weight loss and anxiety Continue to try to eat three meals a day Go to medical supply store and give order for diabetic shoes Eating Healthy Foods: Care Instructions Your Care Instructions Eating healthy foods can help lower your risk for disease. Healthy food gives you energy and keeps your heart strong, your brain active, your muscles working, and your bones strong. A healthy diet includes a variety of foods from the basic food groups: grains, vegetables, fruits, milk and milk products, and meat and beans. Some people may eat more of their favorite foods from only one food group and, as a result, miss getting the nutrients they need. So, it is important to pay attention not only to what you eat but also to what you are missing from your diet. You can eat a healthy, balanced diet by making a few small changes. Follow-up care is a key part of your treatment and safety. Be sure to make and go to all appointments, and call your doctor if you are having problems. It's also a good idea to know your test results and keep a list of the medicines you take. How can you care for yourself at home? Look at what you eat · Keep a food diary for a week or two and record everything you eat or drink. Track the number of servings you eat from each food group. · For a balanced diet every day, eat a variety of: ¨ 6 or more ounce-equivalents of grains, such as cereals, breads, crackers, rice, or pasta, every day. An ounce-equivalent is 1 slice of bread, 1 cup of ready-to-eat cereal, or ½ cup of cooked rice, cooked pasta, or cooked cereal. 
¨ 2½ cups of vegetables, especially: § Dark-green vegetables such as broccoli and spinach. § Orange vegetables such as carrots and sweet potatoes. § Dry beans (such as malone and kidney beans) and peas (such as lentils). ¨ 2 cups of fresh, frozen, or canned fruit. A small apple or 1 banana or orange equals 1 cup. ¨ 3 cups of nonfat or low-fat milk, yogurt, or other milk products. ¨ 5½ ounces of meat and beans, such as chicken, fish, lean meat, beans, nuts, and seeds. One egg, 1 tablespoon of peanut butter, ½ ounce nuts or seeds, or ¼ cup of cooked beans equals 1 ounce of meat. · Learn how to read food labels for serving sizes and ingredients. Fast-food and convenience-food meals often contain few or no fruits or vegetables. Make sure you eat some fruits and vegetables to make the meal more nutritious. · Look at your food diary. For each food group, add up what you have eaten and then divide the total by the number of days. This will give you an idea of how much you are eating from each food group. See if you can find some ways to change your diet to make it more healthy. Start small · Do not try to make dramatic changes to your diet all at once. You might feel that you are missing out on your favorite foods and then be more likely to fail. · Start slowly, and gradually change your habits. Try some of the following: ¨ Use whole wheat bread instead of white bread. ¨ Use nonfat or low-fat milk instead of whole milk. ¨ Eat brown rice instead of white rice, and eat whole wheat pasta instead of white-flour pasta. ¨ Try low-fat cheeses and low-fat yogurt. ¨ Add more fruits and vegetables to meals and have them for snacks. ¨ Add lettuce, tomato, cucumber, and onion to sandwiches. ¨ Add fruit to yogurt and cereal. 
Enjoy food · You can still eat your favorite foods. You just may need to eat less of them. If your favorite foods are high in fat, salt, and sugar, limit how often you eat them, but do not cut them out entirely. · Eat a wide variety of foods. Make healthy choices when eating out · The type of restaurant you choose can help you make healthy choices. Even fast-food chains are now offering more low-fat or healthier choices on the menu. · Choose smaller portions, or take half of your meal home. · When eating out, try: ¨ A veggie pizza with a whole wheat crust or grilled chicken (instead of sausage or pepperoni). ¨ Pasta with roasted vegetables, grilled chicken, or marinara sauce instead of cream sauce. ¨ A vegetable wrap or grilled chicken wrap. ¨ Broiled or poached food instead of fried or breaded items. Make healthy choices easy · Buy packaged, prewashed, ready-to-eat fresh vegetables and fruits, such as baby carrots, salad mixes, and chopped or shredded broccoli and cauliflower. · Buy packaged, presliced fruits, such as melon or pineapple. · Choose 100% fruit or vegetable juice instead of soda. Limit juice intake to 4 to 6 oz (½ to ¾ cup) a day. · Blend low-fat yogurt, fruit juice, and canned or frozen fruit to make a smoothie for breakfast or a snack. Where can you learn more? Go to http://afshan-dick.info/. Enter T756 in the search box to learn more about \"Eating Healthy Foods: Care Instructions. \" Current as of: May 12, 2017 Content Version: 11.4 © 2883-8090 [employer]. Care instructions adapted under license by [employer] (which disclaims liability or warranty for this information). If you have questions about a medical condition or this instruction, always ask your healthcare professional. [address] any warranty or liability for your use of this information. Introducing [facility]! Dear Olinda Orourke: Thank you for requesting a [employer] account. Our records indicate that you already have an active [employer] account. You can access your account anytime at [email or web address] Did you know that you can access your hospital and ER discharge instructions at any time in [employer]? You can also review all of your test results from your hospital stay or ER visit. Additional Information If you have questions, please visit the Frequently Asked Questions section of the [employer] website at [email or web address] Remember, [employer] is NOT to be used for urgent needs. For medical emergencies, dial 911. Now available from your iPhone and Android! Please provide this summary of care documentation to your next provider. If you have any questions after today's visit, please call [phone].

## 2018-04-25 NOTE — PATIENT INSTRUCTIONS
Continue taking medications as prescribed    No need to check Blood Pressure    Ask specialist to send note from visit to Dr. Melody Enamorado office after completion    Return in 6 weeks to follow up weight loss and anxiety    Continue to try to eat three meals a day    Go to medical supply store and give order for diabetic shoes         Eating Healthy Foods: Care Instructions  Your Care Instructions    Eating healthy foods can help lower your risk for disease. Healthy food gives you energy and keeps your heart strong, your brain active, your muscles working, and your bones strong. A healthy diet includes a variety of foods from the basic food groups: grains, vegetables, fruits, milk and milk products, and meat and beans. Some people may eat more of their favorite foods from only one food group and, as a result, miss getting the nutrients they need. So, it is important to pay attention not only to what you eat but also to what you are missing from your diet. You can eat a healthy, balanced diet by making a few small changes. Follow-up care is a key part of your treatment and safety. Be sure to make and go to all appointments, and call your doctor if you are having problems. It's also a good idea to know your test results and keep a list of the medicines you take. How can you care for yourself at home? Look at what you eat  · Keep a food diary for a week or two and record everything you eat or drink. Track the number of servings you eat from each food group. · For a balanced diet every day, eat a variety of:  ¨ 6 or more ounce-equivalents of grains, such as cereals, breads, crackers, rice, or pasta, every day. An ounce-equivalent is 1 slice of bread, 1 cup of ready-to-eat cereal, or ½ cup of cooked rice, cooked pasta, or cooked cereal.  ¨ 2½ cups of vegetables, especially:  § Dark-green vegetables such as broccoli and spinach. § Orange vegetables such as carrots and sweet potatoes.   § Dry beans (such as malone and kidney beans) and peas (such as lentils). ¨ 2 cups of fresh, frozen, or canned fruit. A small apple or 1 banana or orange equals 1 cup. ¨ 3 cups of nonfat or low-fat milk, yogurt, or other milk products. ¨ 5½ ounces of meat and beans, such as chicken, fish, lean meat, beans, nuts, and seeds. One egg, 1 tablespoon of peanut butter, ½ ounce nuts or seeds, or ¼ cup of cooked beans equals 1 ounce of meat. · Learn how to read food labels for serving sizes and ingredients. Fast-food and convenience-food meals often contain few or no fruits or vegetables. Make sure you eat some fruits and vegetables to make the meal more nutritious. · Look at your food diary. For each food group, add up what you have eaten and then divide the total by the number of days. This will give you an idea of how much you are eating from each food group. See if you can find some ways to change your diet to make it more healthy. Start small  · Do not try to make dramatic changes to your diet all at once. You might feel that you are missing out on your favorite foods and then be more likely to fail. · Start slowly, and gradually change your habits. Try some of the following:  ¨ Use whole wheat bread instead of white bread. ¨ Use nonfat or low-fat milk instead of whole milk. ¨ Eat brown rice instead of white rice, and eat whole wheat pasta instead of white-flour pasta. ¨ Try low-fat cheeses and low-fat yogurt. ¨ Add more fruits and vegetables to meals and have them for snacks. ¨ Add lettuce, tomato, cucumber, and onion to sandwiches. ¨ Add fruit to yogurt and cereal.  Enjoy food  · You can still eat your favorite foods. You just may need to eat less of them. If your favorite foods are high in fat, salt, and sugar, limit how often you eat them, but do not cut them out entirely. · Eat a wide variety of foods. Make healthy choices when eating out  · The type of restaurant you choose can help you make healthy choices.  Even fast-food chains are now offering more low-fat or healthier choices on the menu. · Choose smaller portions, or take half of your meal home. · When eating out, try:  ¨ A veggie pizza with a whole wheat crust or grilled chicken (instead of sausage or pepperoni). ¨ Pasta with roasted vegetables, grilled chicken, or marinara sauce instead of cream sauce. ¨ A vegetable wrap or grilled chicken wrap. ¨ Broiled or poached food instead of fried or breaded items. Make healthy choices easy  · Buy packaged, prewashed, ready-to-eat fresh vegetables and fruits, such as baby carrots, salad mixes, and chopped or shredded broccoli and cauliflower. · Buy packaged, presliced fruits, such as melon or pineapple. · Choose 100% fruit or vegetable juice instead of soda. Limit juice intake to 4 to 6 oz (½ to ¾ cup) a day. · Blend low-fat yogurt, fruit juice, and canned or frozen fruit to make a smoothie for breakfast or a snack. Where can you learn more? Go to http://afshan-dick.info/. Enter T756 in the search box to learn more about \"Eating Healthy Foods: Care Instructions. \"  Current as of: May 12, 2017  Content Version: 11.4  © 4555-0077 Healthwise, [employer]. Care instructions adapted under license by [employer] (which disclaims liability or warranty for this information). If you have questions about a medical condition or this instruction, always ask your healthcare professional. [place] [address] any warranty or liability for your use of this information.

## 2018-04-25 NOTE — PROGRESS NOTES
Chief Complaint   Patient presents with    Hypertension       Pt preferred language for health care discussion is [place]. Is someone accompanying this pt? no    Is the patient using any DME equipment during OV? no    Pt currently taking Antiplatelet therapy? ASA    Depression Screening:  PHQ over the last two weeks 4/25/2018   Little interest or pleasure in doing things Not at all   Feeling down, depressed or hopeless Not at all   Total Score PHQ 2 0       Learning Assessment:  Learning Assessment 4/25/2018   PRIMARY LEARNER Patient   HIGHEST LEVEL OF EDUCATION - PRIMARY LEARNER  SOME COLLEGE   PRIMARY LANGUAGE ENGLISH   LEARNER PREFERENCE PRIMARY LISTENING   ANSWERED BY JUNITO Wu   RELATIONSHIP SELF       Abuse Screening:  Abuse Screening Questionnaire 4/25/2018   Do you ever feel afraid of your partner? N   Are you in a relationship with someone who physically or mentally threatens you? N   Is it safe for you to go home? Y       Health Maintenance reviewed and discussed and ordered per Provider. Pt is due for FIT test/Colonoscopy, Hep C screen, Foot exam, Diabetic eye exam, Pneumo-13 or Peumo-23, Tdap, Shingles Vax. Please order/place referral if appropriate. Any and all required NAE forms filled out by patient and faxed, confirmation received. Coordination of Care:  1. Have you been to the ER, urgent care clinic since your last visit? Hospitalized since your last visit? no    2. Have you seen or consulted any other health care providers outside of the [address] [place] [address] since your last visit? Include any pap smears or colon screening.  no

## 2018-06-06 ENCOUNTER — OFFICE VISIT (OUTPATIENT)
Dept: FAMILY MEDICINE CLINIC | Age: 63
End: 2018-06-06

## 2018-06-06 VITALS
SYSTOLIC BLOOD PRESSURE: 138 MMHG | WEIGHT: 144 LBS | OXYGEN SATURATION: 97 % | HEART RATE: 61 BPM | RESPIRATION RATE: 16 BRPM | BODY MASS INDEX: 23.99 KG/M2 | DIASTOLIC BLOOD PRESSURE: 60 MMHG | HEIGHT: 65 IN | TEMPERATURE: 97.3 F

## 2018-06-06 DIAGNOSIS — F43.22 ADJUSTMENT DISORDER WITH ANXIOUS MOOD: ICD-10-CM

## 2018-06-06 DIAGNOSIS — I10 ESSENTIAL HYPERTENSION: ICD-10-CM

## 2018-06-06 DIAGNOSIS — E78.5 HYPERLIPIDEMIA, UNSPECIFIED HYPERLIPIDEMIA TYPE: ICD-10-CM

## 2018-06-06 DIAGNOSIS — I65.23 BILATERAL CAROTID ARTERY STENOSIS: ICD-10-CM

## 2018-06-06 DIAGNOSIS — E44.1 MILD PROTEIN-CALORIE MALNUTRITION (HCC): Primary | ICD-10-CM

## 2018-06-06 DIAGNOSIS — M06.9 RHEUMATOID ARTHRITIS OF WRIST, UNSPECIFIED LATERALITY, UNSPECIFIED RHEUMATOID FACTOR PRESENCE: ICD-10-CM

## 2018-06-06 RX ORDER — POLYETHYLENE GLYCOL 3350, SODIUM SULFATE, SODIUM CHLORIDE, POTASSIUM CHLORIDE, ASCORBIC ACID, SODIUM ASCORBATE 7.5-2.691G
KIT ORAL
Refills: 0 | COMMUNITY
Start: 2018-05-02 | End: 2018-06-06

## 2018-06-06 NOTE — MR AVS SNAPSHOT
[address] Anitra [place] [address] 
[phone] Patient: Estefany Friedman MRN: [id number] :1955 Visit Information Date & Time Provider Department Dept. Phone Encounter #  
 2018  9:30 AM Washington Nino MD [address] [phone] [id number] Follow-up Instructions Return in about 3 months (around 2018), or if symptoms worsen or fail to improve, for anxiety, sleep, weight loss. Upcoming Health Maintenance Date Due  
 EYE EXAM RETINAL OR DILATED Q1 10/19/1963 Pneumococcal 19-64 Medium Risk (1 of 1 - PPSV23) 10/19/1972 DTaP/Tdap/Td series (1 - Tdap) 10/19/1974 ZOSTER VACCINE AGE 60> 2013 Influenza Age 5 to Adult 2018 HEMOGLOBIN A1C Q6M 10/3/2018 MICROALBUMIN Q1 3/28/2019 LIPID PANEL Q1 3/28/2019 FOOT EXAM Q1 2019 BREAST CANCER SCRN MAMMOGRAM 2019 COLONOSCOPY 2023 Allergies as of 2018  Review Complete On: 2018 By: Washington Nino MD  
  
 Severity Noted Reaction Type Reactions Hydrochlorothiazide  2018    Other (comments) LOW SODIUM Penicillin G  2018    Hives, Rash  
 Sulfa (Sulfonamide Antibiotics)  2018    Hives, Rash Current Immunizations  Never Reviewed No immunizations on file. Not reviewed this visit You Were Diagnosed With   
  
 Codes Comments Mild protein-calorie malnutrition ([facility]    -  Primary ICD-10-CM: E44.1 ICD-9-CM: 263.1 Adjustment disorder with anxious mood     ICD-10-CM: F43.22 
ICD-9-CM: 309.24 Bilateral carotid artery stenosis     ICD-10-CM: I65.23 ICD-9-CM: 433.10, 433.30 Rheumatoid arthritis of wrist, unspecified laterality, unspecified rheumatoid factor presence (HCC)     ICD-10-CM: M06.9 ICD-9-CM: 714.0 Essential hypertension     ICD-10-CM: I10 
ICD-9-CM: 401.9 Vitals BP Pulse Temp Resp Height(growth percentile) Weight(growth percentile)  
 138/60 61 97.3 °F (36.3 °C) (Oral) 16 5' 5\" (1.651 m) 144 lb (65.3 kg) SpO2 BMI OB Status Smoking Status 97% 23.96 kg/m2 Postmenopausal Current Every Day Smoker Vitals History BMI and BSA Data Body Mass Index Body Surface Area  
 23.96 kg/m 2 1.73 m 2 Preferred Pharmacy Pharmacy Name Phone [facility], [address] [phone] Your Updated Medication List  
  
   
This list is accurate as of 6/6/18 10:05 AM.  Always use your most recent med list. amLODIPine 5 mg tablet Commonly known as:  Sanjuana Grebe Take 1 Tab by mouth daily for 180 days. aspirin 325 mg tablet Commonly known as:  ASPIRIN Take 325 mg by mouth daily. atorvastatin 10 mg tablet Commonly known as:  LIPITOR Take 1 Tab by mouth daily for 90 days. calcium carbonate-vitamin D3 600 mg (1,500 mg)-800 unit Rogers Take  by mouth. FOLIC ACID PO Take 400 mcg by mouth. hydrALAZINE 100 mg tablet Commonly known as:  APRESOLINE Take 1 Tab by mouth three (3) times daily for 90 days. Start taking on:  6/25/2018  
  
 hydroxychloroquine 200 mg tablet Commonly known as:  PLAQUENIL Take 1 Tab by mouth two (2) times a day for 90 days. lisinopril 20 mg tablet Commonly known as:  Valene Pencil Take 1 Tab by mouth daily for 180 days. methotrexate 2.5 mg tablet Commonly known as:  Mily Creamer Take 6 Tabs by mouth every Sunday for 90 days. multivitamin tablet Commonly known as:  ONE A DAY Take 1 Tab by mouth daily. predniSONE 10 mg tablet Commonly known as:  Elijah Fast Take 1 Tab by mouth daily as needed for up to 90 days. verapamil  mg CR tablet Commonly known as:  CALAN-SR Take 1 Tab by mouth daily for 180 days. zinc 50 mg Tab tablet Take  by mouth daily. Follow-up Instructions Return in about 3 months (around 9/6/2018), or if symptoms worsen or fail to improve, for anxiety, sleep, weight loss. To-Do List   
 06/27/2018 7:30 AM  
  Appointment with Kyra Geiger at [facility] ([phone]) Patient Instructions CALL INSURANCE COMPANY AND ASK ABOUT COVERAGE FOR PNEUMONIA VACCINE( PPSV 23) AND SHINGLES SHOT, GET WHERE YOU ARE COVERED, PHARMACY OR MD OFFICE AS LONG AS YOU ARE NOT ON PREDNISONE FOR AT LEAST 8 WEEKS 
 
FOR ANXIETY/SLEEP: 
Work on eating 3 meals a day Try to work on meditation at night before bed, instructions below Try to work on yoga for beginners, search you-tube for videos 3 x per week HOW TO MEDITATE: SIMPLE MEDITATION FOR BEGINNERS This meditation exercise is an excellent introduction to meditation techniques. Sit comfortably. You may even want to invest in a meditation chair. Close your eyes. Make no effort to control the breath; simply breathe naturally. Focus your attention on the breath  (think \"im breathing in\" when breathing in, think \"im breathing out\" when breathing out) and on how the body moves with each inhalation and exhalation. Notice the movement of your body as you breathe. Observe your chest, shoulders, rib cage, and belly. Simply focus your attention on your breath without controlling its pace or intensity. If your mind wanders, return your focus back to your breath. Focus on breathing in as you take in a breath and focus on breathing out as you exhale Maintain this meditation practice for two to three minutes to start, and then try it for longer periods gradually extended to 5 to 10 minutes daily. Adjustment Disorder: Care Instructions Your Care Instructions Adjustment disorder means that you have emotional or behavioral problems because of stress.  But your response to the stress is far more severe than a normal response. It is severe enough to affect your work or social life and may cause depression and physical pains and problems. Events that may cause this response can include a divorce, money problems, or starting school or a new job. It might be anything that causes some stress. This disorder is most often a short-term problem. It happens within 3 months of the stressful event or change. If the response lasts longer than 6 months after the event ends, you may have a more serious disorder. Follow-up care is a key part of your treatment and safety. Be sure to make and go to all appointments, and call your doctor if you are having problems. It's also a good idea to know your test results and keep a list of the medicines you take. How can you care for yourself at home? · Go to all counseling sessions. Do not skip any because you are feeling better. · If your doctor prescribed medicines, take them exactly as prescribed. Call your doctor if you think you are having a problem with your medicine. You will get more details on the specific medicines your doctor prescribes. · Discuss the causes of your stress with a good friend or family member. Or you can join a support group for people with similar problems. Talking to others sometimes relieves stress. · Get at least 30 minutes of exercise on most days of the week. Walking is a good choice. You also may want to do other activities, such as running, swimming, cycling, or playing tennis or team sports. Relaxation techniques Do relaxation exercises 10 to 20 minutes a day. You can play soothing, relaxing music while you do them, if you wish. · Tell others in your house that you are going to do your relaxation exercises. Ask them not to disturb you. · Find a comfortable, quiet place. · Lie down on your back, or sit with your back straight. · Focus on your breathing. Make it slow and steady. · Breathe in through your nose. Breathe out through either your nose or mouth. · Breathe deeply, filling up the area between your navel and your rib cage. Breathe so that your belly goes up and down. · Do not hold your breath. · Breathe like this for 5 to 10 minutes. Notice the feeling of calmness throughout your whole body. As you continue to breathe slowly and deeply, relax by doing these next steps for another 5 to 10 minutes: · Tighten and relax each muscle group in your body. Start at your toes, and work your way up to your head. · Imagine your muscle groups relaxing and getting heavy. · Empty your mind of all thoughts. · Let yourself relax more and more deeply. · Be aware of the state of calmness that surrounds you. · When your relaxation time is over, you can bring yourself back to alertness by moving your fingers and toes. Then move your hands and feet. And then move your entire body. Sometimes people fall asleep during relaxation. But they most often wake up soon. · Always give yourself time to return to full alertness before you drive a car. Wait to do anything that might cause an accident if you are not fully alert. Never play a relaxation tape while you drive a car. When should you call for help? Call 911 anytime you think you may need emergency care. For example, call if: 
? · You feel you cannot stop from hurting yourself or someone else. Keep the numbers for these national suicide hotlines: 3-331-614-TALK (4-189.928.3749) and 0-047-GZBBCKP (3-121.836.9163). If you or someone you know talks about suicide or feeling hopeless, get help right away. ? Watch closely for changes in your health, and be sure to contact your doctor if: 
? · You have new anxiety, or your anxiety gets worse. ? · You have been feeling sad, depressed, or hopeless or have lost interest in things that you usually enjoy. ? · You do not get better as expected. Where can you learn more? Go to http://afshan-dick.info/. Enter [id number] [id number] in the search box to learn more about \"Adjustment Disorder: Care Instructions. \" Current as of: July 26, 2016 Content Version: 11.4 © 3576-9850 [employer]. Care instructions adapted under license by [employer] (which disclaims liability or warranty for this information). If you have questions about a medical condition or this instruction, always ask your healthcare professional. [address] any warranty or liability for your use of this information. Introducing [facility]! Dear Pily Juan: Thank you for requesting a JellyCloud account. Our records indicate that you already have an active JellyCloud account. You can access your account anytime at [email or web address] Did you know that you can access your hospital and ER discharge instructions at any time in JellyCloud? You can also review all of your test results from your hospital stay or ER visit. Additional Information If you have questions, please visit the Frequently Asked Questions section of the JellyCloud website at [email or web address] Remember, JellyCloud is NOT to be used for urgent needs. For medical emergencies, dial 911. Now available from your iPhone and Android! Please provide this summary of care documentation to your next provider. If you have any questions after today's visit, please call [phone].

## 2018-06-06 NOTE — PATIENT INSTRUCTIONS
CALL INSURANCE COMPANY AND ASK ABOUT COVERAGE FOR PNEUMONIA VACCINE( PPSV 23) AND SHINGLES SHOT, GET WHERE YOU ARE COVERED, PHARMACY OR MD OFFICE AS LONG AS YOU ARE NOT ON PREDNISONE FOR AT LEAST 8 WEEKS    FOR ANXIETY/SLEEP:  Work on eating 3 meals a day  Try to work on meditation at night before bed, instructions below  Try to work on yoga for beginners, search you-tube for videos 3 x per week    HOW TO MEDITATE: SIMPLE MEDITATION FOR BEGINNERS  This meditation exercise is an excellent introduction to meditation techniques. Sit comfortably. You may even want to invest in a meditation chair. Close your eyes. Make no effort to control the breath; simply breathe naturally. Focus your attention on the breath  (think \"im breathing in\" when breathing in, think \"im breathing out\" when breathing out) and on how the body moves with each inhalation and exhalation. Notice the movement of your body as you breathe. Observe your chest, shoulders, rib cage, and belly. Simply focus your attention on your breath without controlling its pace or intensity. If your mind wanders, return your focus back to your breath. Focus on breathing in as you take in a breath and focus on breathing out as you exhale  Maintain this meditation practice for two to three minutes to start, and then try it for longer periods gradually extended to 5 to 10 minutes daily. Adjustment Disorder: Care Instructions  Your Care Instructions    Adjustment disorder means that you have emotional or behavioral problems because of stress. But your response to the stress is far more severe than a normal response. It is severe enough to affect your work or social life and may cause depression and physical pains and problems. Events that may cause this response can include a divorce, money problems, or starting school or a new job. It might be anything that causes some stress. This disorder is most often a short-term problem.  It happens within 3 months of the stressful event or change. If the response lasts longer than 6 months after the event ends, you may have a more serious disorder. Follow-up care is a key part of your treatment and safety. Be sure to make and go to all appointments, and call your doctor if you are having problems. It's also a good idea to know your test results and keep a list of the medicines you take. How can you care for yourself at home? · Go to all counseling sessions. Do not skip any because you are feeling better. · If your doctor prescribed medicines, take them exactly as prescribed. Call your doctor if you think you are having a problem with your medicine. You will get more details on the specific medicines your doctor prescribes. · Discuss the causes of your stress with a good friend or family member. Or you can join a support group for people with similar problems. Talking to others sometimes relieves stress. · Get at least 30 minutes of exercise on most days of the week. Walking is a good choice. You also may want to do other activities, such as running, swimming, cycling, or playing tennis or team sports. Relaxation techniques  Do relaxation exercises 10 to 20 minutes a day. You can play soothing, relaxing music while you do them, if you wish. · Tell others in your house that you are going to do your relaxation exercises. Ask them not to disturb you. · Find a comfortable, quiet place. · Lie down on your back, or sit with your back straight. · Focus on your breathing. Make it slow and steady. · Breathe in through your nose. Breathe out through either your nose or mouth. · Breathe deeply, filling up the area between your navel and your rib cage. Breathe so that your belly goes up and down. · Do not hold your breath. · Breathe like this for 5 to 10 minutes. Notice the feeling of calmness throughout your whole body.   As you continue to breathe slowly and deeply, relax by doing these next steps for another 5 to 10 minutes:  · Tighten and relax each muscle group in your body. Start at your toes, and work your way up to your head. · Imagine your muscle groups relaxing and getting heavy. · Empty your mind of all thoughts. · Let yourself relax more and more deeply. · Be aware of the state of calmness that surrounds you. · When your relaxation time is over, you can bring yourself back to alertness by moving your fingers and toes. Then move your hands and feet. And then move your entire body. Sometimes people fall asleep during relaxation. But they most often wake up soon. · Always give yourself time to return to full alertness before you drive a car. Wait to do anything that might cause an accident if you are not fully alert. Never play a relaxation tape while you drive a car. When should you call for help? Call 911 anytime you think you may need emergency care. For example, call if:  ? · You feel you cannot stop from hurting yourself or someone else. Keep the numbers for these national suicide hotlines: 7-207-413-TALK (7-215.524.5016) and 2-536-LJMHMVX (5-288.813.3939). If you or someone you know talks about suicide or feeling hopeless, get help right away. ? Watch closely for changes in your health, and be sure to contact your doctor if:  ? · You have new anxiety, or your anxiety gets worse. ? · You have been feeling sad, depressed, or hopeless or have lost interest in things that you usually enjoy. ? · You do not get better as expected. Where can you learn more? Go to http://afshan-dick.info/. Enter [id number] in the search box to learn more about \"Adjustment Disorder: Care Instructions. \"  Current as of: July 26, 2016  Content Version: 11.4  © 7992-2022 Healthwise, [employer]. Care instructions adapted under license by [employer] (which disclaims liability or warranty for this information).  If you have questions about a medical condition or this instruction, always ask your healthcare professional. [address] any warranty or liability for your use of this information.

## 2018-06-06 NOTE — PROGRESS NOTES
Internal Medicine Follow Up Visit Note    Chief Complaint   Patient presents with    Anxiety    Weight Loss       HPI:  Estefany Friedman is a 57 y.o.  female with history significant for RA, HTN, DM diet controlled is here for the above complaint(s). Last seen 2018. Since last visit had colonoscopy this past week, no polyps removed, right  2018 per vascular scheduled. Seen by ophthalmologist, new Rx for glasses and possible cataract surgery in 1 year, rheumatology in 1.5 weeks. Anxiety: Onset since move to [facility] from [place]. For past 2 weeks reports more than half days of trouble staying asleep, and several days of worrying too much about different things and trouble relaxing. Sleep unchanged since last visit. Since last visit anxiety is \"a little less. \"  Reports no panic attacks. Not interested in counseling currently. Not interested in medications. Weight Loss: Since last visit down 3 lbs. With prep for colonoscopy loss some weight, down to 140. Appetite is still low. Eating yogurt and cottage cheese. Now eating 2 meals and 1 protein drink. Hasn't eaten 3 meals a day in \"years\", typically breakfast and lunch, no dinner. Water intake 40-60 ounces per day. In 2017 ~170. Low appetite since move to [place] end of November. ROS: as per HPI    Current Outpatient Prescriptions   Medication Sig    verapamil ER (CALAN-SR) 180 mg CR tablet Take 1 Tab by mouth daily for 180 days.  amLODIPine (NORVASC) 5 mg tablet Take 1 Tab by mouth daily for 180 days.  lisinopril (PRINIVIL, ZESTRIL) 20 mg tablet Take 1 Tab by mouth daily for 180 days.  atorvastatin (LIPITOR) 10 mg tablet Take 1 Tab by mouth daily for 90 days.  [START ON 2018] hydrALAZINE (APRESOLINE) 100 mg tablet Take 1 Tab by mouth three (3) times daily for 90 days.  zinc 50 mg tab tablet Take  by mouth daily.  FOLIC ACID PO Take 467 mcg by mouth.     multivitamin (ONE A DAY) tablet Take 1 Tab by mouth daily.  aspirin (ASPIRIN) 325 mg tablet Take 325 mg by mouth daily.  calcium carbonate-vitamin D3 600 mg (1,500 mg)-800 unit chew Take  by mouth.  hydroxychloroquine (PLAQUENIL) 200 mg tablet Take 1 Tab by mouth two (2) times a day for 90 days.  predniSONE (DELTASONE) 10 mg tablet Take 1 Tab by mouth daily as needed for up to 90 days.  methotrexate (RHEUMATREX) 2.5 mg tablet Take 6 Tabs by mouth every Sunday for 90 days. No current facility-administered medications for this visit. Health Maintenance   Topic Date Due    EYE EXAM RETINAL OR DILATED Q1  10/19/1963    Pneumococcal 19-64 Medium Risk (1 of 1 - PPSV23) 10/19/1972    DTaP/Tdap/Td series (1 - Tdap) 10/19/1974    ZOSTER VACCINE AGE 60>  08/19/2013    Influenza Age 9 to Adult  08/01/2018    HEMOGLOBIN A1C Q6M  10/03/2018    MICROALBUMIN Q1  03/28/2019    LIPID PANEL Q1  03/28/2019    FOOT EXAM Q1  04/25/2019    BREAST CANCER SCRN MAMMOGRAM  06/01/2019    COLONOSCOPY  06/04/2023    Hepatitis C Screening  Completed       There is no immunization history on file for this patient. Allergies and Medications: Reviewed and updated in EMR. Patient Active Problem List   Diagnosis Code    Carotid artery stenosis I65.29    Diabetes type 2, controlled ([facility] [address] E11.9    HLD (hyperlipidemia) E78.5    Hypertension I10    PAD (peripheral artery disease) (HCC) I73.9    Rheumatoid arthritis ([facility] [address] M06.9    S/P insertion of iliac artery stent Z95.828    Cataract H26.9    Hypochloremia E87.8    Diabetic polyneuropathy associated with type 2 diabetes mellitus (HCC) E11.42    Adjustment disorder with anxious mood F43.22     Family History, Social History, Past Medical History, Surgical History: Reviewed and updated in EMR as appropriate.       OBJECTIVE:   Visit Vitals    /60    Pulse 61    Temp 97.3 °F (36.3 °C) (Oral)    Resp 16    Ht 5' 5\" (1.651 m)    Wt 144 lb (65.3 kg)    SpO2 97%  Comment: ra    BMI 23.96 kg/m2        BP Readings from Last 3 Encounters:   06/06/18 138/60   04/25/18 133/54   03/28/18 117/53     Wt Readings from Last 3 Encounters:   06/06/18 144 lb (65.3 kg)   04/25/18 147 lb 6.4 oz (66.9 kg)   03/28/18 149 lb (67.6 kg)       General: Pleasant adult woman sitting comfortably in no acute distress  HEENT: Head is atraumatic normo-cephalic. Neuro: Alert and oriented x3. MSE: mood euthymic with underlying anxiety, affect congruent and reactive    PHQ-9: 2, not difficult at all  PARAM-7: 2, not difficult at all    Nursing Notes Reviewed. LABS/RADIOLOGICAL TESTS:    Lab Results   Component Value Date/Time    Sodium 134 04/03/2018 10:20 AM    Potassium 3.9 04/03/2018 10:20 AM    Chloride 93 (L) 04/03/2018 10:20 AM    CO2 23 04/03/2018 10:20 AM    Glucose 81 04/03/2018 10:20 AM    BUN 8 04/03/2018 10:20 AM    Creatinine 0.7 (L) 04/03/2018 10:20 AM     All lab results and radiological studies were reviewed and discussed with the patient. ASSESSMENT/PLAN:      ICD-10-CM ICD-9-CM    1. Mild protein-calorie malnutrition (HCC) E44.1 263.1 Encouraged to eat 3 meals a day  Continue water intake   2. Adjustment disorder with anxious mood F43.22 309.24 Meditation and yoga discussed  Pt declines counseling at this time  No indication for medication but if weight loss continues, consider start of mirtazapine to target sleep and appetite    3. Bilateral carotid artery stenosis I65.23 433.10 F/u with specialist     433.30    4. Rheumatoid arthritis of wrist, unspecified laterality, unspecified rheumatoid factor presence (HCC) M06.9 714.0 F/u with specialist   5. Essential hypertension: controlled I10 401.9 Continue current mgmt  Low salt diet       Requested Prescriptions      No prescriptions requested or ordered in this encounter     Patient verbalized understanding and agreement with the plan. Patient was given an after-visit summary.     Follow-up Disposition: Not on File or sooner if worsening symptoms. More than 50% of this 25 min visit was spent counseling the patient face to face about etiology and treatment of health conditions outlined in assessment and plan. Ambreen Zhang M.D.   [address] -

## 2018-06-22 DIAGNOSIS — M06.9 RHEUMATOID ARTHRITIS OF WRIST, UNSPECIFIED LATERALITY, UNSPECIFIED RHEUMATOID FACTOR PRESENCE: ICD-10-CM

## 2018-06-25 RX ORDER — PREDNISONE 10 MG/1
TABLET ORAL
Qty: 30 TAB | Refills: 2 | OUTPATIENT
Start: 2018-06-25

## 2018-06-25 RX ORDER — HYDROXYCHLOROQUINE SULFATE 200 MG/1
TABLET, FILM COATED ORAL
Qty: 60 TAB | Refills: 2 | OUTPATIENT
Start: 2018-06-25

## 2018-06-27 PROBLEM — I65.21 CAROTID ARTERY STENOSIS, SYMPTOMATIC, RIGHT: Status: ACTIVE | Noted: 2018-06-27

## 2018-06-28 DIAGNOSIS — M06.9 RHEUMATOID ARTHRITIS OF WRIST, UNSPECIFIED LATERALITY, UNSPECIFIED RHEUMATOID FACTOR PRESENCE: ICD-10-CM

## 2018-06-28 RX ORDER — HYDROXYCHLOROQUINE SULFATE 200 MG/1
TABLET, FILM COATED ORAL
Qty: 60 TAB | Refills: 2 | OUTPATIENT
Start: 2018-06-28

## 2018-06-28 NOTE — TELEPHONE ENCOUNTER
Called rheumatologist to discuss plaquinil dosing. Per Dr. Hermelinda Scott patient is to continue plaquenil dosing BID and they do feel comfortable prescribing medications to patient. Their office will call patient when refill ready for . Dominique Rosas M.D.   [place] [phone]  [facility], [facility] [phone]

## 2018-07-03 PROBLEM — K57.30 DIVERTICULOSIS OF LARGE INTESTINE WITHOUT HEMORRHAGE: Status: ACTIVE | Noted: 2018-07-03

## 2018-07-05 ENCOUNTER — TELEPHONE (OUTPATIENT)
Dept: FAMILY MEDICINE CLINIC | Age: 63
End: 2018-07-05

## 2018-07-05 DIAGNOSIS — M06.9 RHEUMATOID ARTHRITIS OF WRIST, UNSPECIFIED LATERALITY, UNSPECIFIED RHEUMATOID FACTOR PRESENCE: ICD-10-CM

## 2018-07-05 PROBLEM — I27.20 MILD PULMONARY HYPERTENSION (HCC): Status: ACTIVE | Noted: 2018-07-05

## 2018-07-05 RX ORDER — METHOTREXATE 2.5 MG/1
TABLET ORAL
Qty: 24 TAB | Refills: 2 | OUTPATIENT
Start: 2018-07-05

## 2018-07-05 NOTE — TELEPHONE ENCOUNTER
Advised patient to call RA office to have them fill her medication. Patient verbalized understanding of conversation.

## 2018-07-05 NOTE — TELEPHONE ENCOUNTER
LPN please call patient and inform rheumatologist needs to be informed of need for refill of methotrexate. Margie Narayan M.D.   [address], [address] [place] [address], [address] [place] [address] -   [place] [phone]

## 2018-09-11 ENCOUNTER — TELEPHONE (OUTPATIENT)
Dept: FAMILY MEDICINE CLINIC | Age: 63
End: 2018-09-11

## 2018-09-11 NOTE — TELEPHONE ENCOUNTER
Pt states might have a stomach virus. States thought it was something she ate but it has been 5 days now and still experiencing diarrhea, gas, and low grade fever. Pt inquiring if there is anything OTC that she can take?

## 2018-09-11 NOTE — TELEPHONE ENCOUNTER
Pt reports onset last weekend with abdominal cramping, loose stools 6 BM per day brown in color no significant foul smelling stools, no blood or black stools, gas, temperature 100.5 last occurred 2 hours ago mot taking anything for it. Some nausea no vomiting. Pt reports no LH or dizziness. Positive weakness. Able to keep down water and Gatorade. BM today 3 today so far, not eating much. Okay to take gas-x and imodium AD OTC. Increase water to 2.5 to 3 liters water per day mixed with gatorade. [place] diet. Given report to hospital precautions. Lashonda Chakraborty M.D.   [address], [address] [place] [address], [address] [place] [address] [phone]  [facility] -

## 2018-09-20 ENCOUNTER — OFFICE VISIT (OUTPATIENT)
Dept: FAMILY MEDICINE CLINIC | Age: 63
End: 2018-09-20

## 2018-09-20 VITALS
HEIGHT: 64 IN | RESPIRATION RATE: 16 BRPM | SYSTOLIC BLOOD PRESSURE: 116 MMHG | DIASTOLIC BLOOD PRESSURE: 54 MMHG | WEIGHT: 147 LBS | TEMPERATURE: 97.7 F | OXYGEN SATURATION: 98 % | HEART RATE: 75 BPM | BODY MASS INDEX: 25.1 KG/M2

## 2018-09-20 DIAGNOSIS — M06.9 RHEUMATOID ARTHRITIS OF WRIST, UNSPECIFIED LATERALITY, UNSPECIFIED RHEUMATOID FACTOR PRESENCE: ICD-10-CM

## 2018-09-20 DIAGNOSIS — G47.00 INSOMNIA, UNSPECIFIED TYPE: Primary | ICD-10-CM

## 2018-09-20 DIAGNOSIS — I65.21 STENOSIS OF RIGHT CAROTID ARTERY: ICD-10-CM

## 2018-09-20 DIAGNOSIS — E66.3 OVERWEIGHT (BMI 25.0-29.9): ICD-10-CM

## 2018-09-20 DIAGNOSIS — F41.9 ANXIETY: ICD-10-CM

## 2018-09-20 DIAGNOSIS — I73.9 PAD (PERIPHERAL ARTERY DISEASE) (HCC): ICD-10-CM

## 2018-09-20 DIAGNOSIS — I10 ESSENTIAL HYPERTENSION: ICD-10-CM

## 2018-09-20 PROBLEM — F43.22 ADJUSTMENT DISORDER WITH ANXIOUS MOOD: Status: RESOLVED | Noted: 2018-06-06 | Resolved: 2018-09-20

## 2018-09-20 PROBLEM — E87.8 HYPOCHLOREMIA: Status: RESOLVED | Noted: 2018-04-02 | Resolved: 2018-09-20

## 2018-09-20 RX ORDER — VERAPAMIL HYDROCHLORIDE 180 MG/1
180 TABLET, EXTENDED RELEASE ORAL DAILY
Qty: 90 TAB | Refills: 3 | Status: SHIPPED | OUTPATIENT
Start: 2018-10-20 | End: 2019-01-31

## 2018-09-20 RX ORDER — AMLODIPINE BESYLATE 5 MG/1
5 TABLET ORAL DAILY
Qty: 90 TAB | Refills: 3 | Status: SHIPPED | OUTPATIENT
Start: 2018-10-20 | End: 2019-04-10

## 2018-09-20 RX ORDER — LISINOPRIL 20 MG/1
20 TABLET ORAL DAILY
Qty: 90 TAB | Refills: 3 | Status: SHIPPED | OUTPATIENT
Start: 2018-10-20 | End: 2019-05-09 | Stop reason: DRUGHIGH

## 2018-09-20 RX ORDER — RAMELTEON 8 MG/1
8 TABLET ORAL
Qty: 90 TAB | Refills: 0 | Status: SHIPPED | OUTPATIENT
Start: 2018-09-20 | End: 2018-11-20

## 2018-09-20 NOTE — PROGRESS NOTES
Chief Complaint Patient presents with  Anxiety  Sleep Problem  Weight Loss 1. Have you been to the ER, urgent care clinic since your last visit? [facility] your last [facility]. Have you seen or consulted any other health care providers outside of the [address] [place] [address] since your last visit? Include any pap smears or colon screening.  No

## 2018-09-20 NOTE — PATIENT INSTRUCTIONS
For Sleep:  
Start taking ramelteon 8 mg 1.5-2 hours before bedtime Consider meditation, outlined below Consider yoga for beginners Continue to eat healthy, good water intake and limited caffeine intake Return in 8 weeks for follow up Insomnia: Care Instructions Your Care Instructions Insomnia is the inability to sleep well. It is a common problem for most people at some time. Insomnia may make it hard for you to get to sleep, stay asleep, or sleep as long as you need to. This can make you tired and grouchy during the day. It can also make you forgetful, less effective at work, and unhappy. Insomnia can be caused by conditions such as depression or anxiety. Pain can also affect your ability to sleep. When these problems are solved, the insomnia usually clears up. But sometimes bad sleep habits can cause insomnia. If insomnia is affecting your work or your enjoyment of life, you can take steps to improve your sleep. Follow-up care is a key part of your treatment and safety. Be sure to make and go to all appointments, and call your doctor if you are having problems. It's also a good idea to know your test results and keep a list of the medicines you take. How can you care for yourself at home? What to avoid · Do not have drinks with caffeine, such as coffee or black tea, for 8 hours before bed. · Do not smoke or use other types of tobacco near bedtime. Nicotine is a stimulant and can keep you awake. · Avoid drinking alcohol late in the evening, because it can cause you to wake in the middle of the night. · Do not eat a big meal close to bedtime. If you are hungry, eat a light snack. · Do not drink a lot of water close to bedtime, because the need to urinate may wake you up during the night. · Do not read or watch TV in bed. Use the bed only for sleeping and sexual activity. What to try · Go to bed at the same time every night, and wake up at the same time every morning. Do not take naps during the day. · Keep your bedroom quiet, dark, and cool. · Sleep on a comfortable pillow and mattress. · If watching the clock makes you anxious, turn it facing away from you so you cannot see the time. · If you worry when you lie down, start a worry book. Well before bedtime, write down your worries, and then set the book and your concerns aside. · Try meditation or other relaxation techniques before you go to bed. · If you cannot fall asleep, get up and go to another room until you feel sleepy. Do something relaxing. Repeat your bedtime routine before you go to bed again. · Make your house quiet and calm about an hour before bedtime. Turn down the lights, turn off the TV, log off the computer, and turn down the volume on music. This can help you relax after a busy day. When should you call for help? Watch closely for changes in your health, and be sure to contact your doctor if: 
  · Your efforts to improve your sleep do not work.  
  · Your insomnia gets worse.  
  · You have been feeling down, depressed, or hopeless or have lost interest in things that you usually enjoy. Where can you learn more? Go to http://afshan-dick.info/. Enter P513 in the search box to learn more about \"Insomnia: Care Instructions. \" Current as of: October 10, 2017 Content Version: 11.7 © 8002-0393 [employer]. Care instructions adapted under license by [employer] (which disclaims liability or warranty for this information). If you have questions about a medical condition or this instruction, always ask your healthcare professional. [address] any warranty or liability for your use of this information. Ramelteon (By mouth) Ramelteon ([id number]-on) Treats insomnia (trouble falling asleep). Brand Name(s): Rozerem There may be other brand names for this medicine. When This Medicine Should Not Be Used: You should not use this medicine if you have had an allergic reaction to ramelteon, if you are also using fluvoxamine (Luvox®), or if you have severe liver disease. How to Use This Medicine:  
Tablet · Your doctor will tell you how much medicine to use. Do not use more than directed. · It is best to take this medicine no more than 30 minutes before you go to bed. After you take the medicine, do not engage in any other activity except getting ready for bed. · Do not take this medicine with or right after a high-fat meal. 
· Swallow the tablet whole. Do not break, crush, or chew it. · This medicine should come with a Medication Guide. Ask your pharmacist for a copy if you do not have one. If a dose is missed: · If you forget to take your medicine at bedtime and you cannot sleep, take it as soon as you remember the missed dose. If it is almost time for you to awaken, skip the missed dose and wait until the next night to take your medicine. Do not take extra medicine to make up for a missed dose. How to Store and Dispose of This Medicine: · Store the medicine in a closed container at room temperature, away from heat, moisture, and direct light. · Ask your pharmacist, doctor, or health caregiver about the best way to dispose of any outdated medicine or medicine no longer needed. · Keep all medicine out of the reach of children. Never share your medicine with anyone. Drugs and Foods to Avoid: Ask your doctor or pharmacist before using any other medicine, including over-the-counter medicines, vitamins, and herbal products. · Make sure your doctor knows if you are also using donepezil (Aricept®), doxepin (Aman ), fluconazole (Diflucan®), ketoconazole (Anjelica Crouch), or rifampin (Rifadin®, Rimactane®). · Tell your doctor if you use anything else that makes you sleepy. Some examples are allergy medicine, narcotic pain medicine, and alcohol. · Do not drink alcohol while you are using this medicine. Warnings While Using This Medicine: · Make sure your doctor knows if you are pregnant or breastfeeding, or if you have liver disease, sleep apnea, or a breathing disorder such as COPD (chronic obstructive pulmonary disease). Tell your doctor if you have a history of depression or mental illness, or if you have ever had thoughts of hurting yourself. · This medicine may cause a serious type of allergic reaction called anaphylaxis. Anaphylaxis can be life-threatening and requires immediate medical attention. Stop taking this medicine and call your doctor right away if you have itching, hives, trouble breathing, or any swelling of your hands, face, mouth, or tongue when you take this medicine. · You might have mood or behavior changes with this medicine, such as feeling sad or hopeless, or getting upset easily. You could feel nervous or hostile. Some people become violent and want to hurt themselves or others. You might have too much energy, or see or hear unusual things. Call your doctor right away if you have any strange feelings, thoughts, or behaviors. · This medicine may cause sleep-related behaviors such as driving a car (sleep-driving), walking (sleep-walking), having sex, making phone calls, or preparing and eating food while you are asleep or not fully awake. If these unwanted effects occur, tell your doctor right away. · This medicine may cause some hormone changes. Talk to your doctor if you have any of these symptoms: a decreased interest in sex; problems getting pregnant; an irregular or change in menstrual periods; or discharge from the nipples (in women). · This medicine may make you dizzy or drowsy. Avoid driving, using machines, or doing anything else that could be dangerous if you are not alert. · Call your doctor if your symptoms do not improve or if they get worse. Possible Side Effects While Using This Medicine:  
Call your doctor right away if you notice any of these side effects: · Allergic reaction: Itching or hives, swelling in your face or hands, swelling or tingling in your mouth or throat, chest tightness, trouble breathing · Changes in behavior or thoughts of hurting yourself or others. · Changes in menstrual periods. · Discharge from the nipples in women. · Feeling depressed, anxious, or agitated. · Loss of interest in sex. · Seeing, hearing, or feeling things that are not there. · Unusual thoughts or behavior. · Unusual tiredness or weakness. · Worsening of insomnia (trouble sleeping). If you notice these less serious side effects, talk with your doctor: · Dizziness or drowsiness. · Headaches. · Nausea. If you notice other side effects that you think are caused by this medicine, tell your doctor. Call your doctor for medical advice about side effects. You may report side effects to FDA at 5-177-FDA-1974 © 2017 [address] Information is for End User's use only and may not be sold, redistributed or otherwise used for commercial purposes. The above information is an  only. It is not intended as medical advice for individual conditions or treatments. Talk to your doctor, nurse or pharmacist before following any medical regimen to see if it is safe and effective for you.

## 2018-09-20 NOTE — MR AVS SNAPSHOT
Eligio Boudreaux 
 
 
 [address] [facility] [address] [address] 
[phone] Patient: Misael Leonardo MRN: [id number] :1955 Visit Information Date & Time Provider Department Dept. Phone Encounter #  
 2018  9:30 AM Bart Michelle MD [address] [phone] [id number] Follow-up Instructions Return in about 2 months (around 2018), or if symptoms worsen or fail to improve, for insomnia. Upcoming Health Maintenance Date Due ZOSTER VACCINE AGE 60> 2013 Bone Densitometry (Dexa) Screening 10/19/2018 HEMOGLOBIN A1C Q6M 2018 MICROALBUMIN Q1 3/28/2019 LIPID PANEL Q1 3/28/2019 FOOT EXAM Q1 2019 EYE EXAM RETINAL OR DILATED Q1 5/10/2019 BREAST CANCER SCRN MAMMOGRAM 2019 DTaP/Tdap/Td series (2 - Td) 2021 COLONOSCOPY 2023 Allergies as of 2018  Review Complete On: 2018 By: Bart Michelle MD  
  
 Severity Noted Reaction Type Reactions Hydrochlorothiazide  2018    Other (comments) LOW SODIUM Penicillin G  2018    Hives, Rash  
 Sulfa (Sulfonamide Antibiotics)  2018    Hives, Rash Current Immunizations  Reviewed on 2018 Name Date Influenza Vaccine 2018, 10/1/2017 Pneumococcal Polysaccharide (PPSV-23) 2010 Tdap 2011 Reviewed by Bart Michelle MD on 2018 at  9:58 AM  
You Were Diagnosed With   
  
 Codes Comments Insomnia, unspecified type    -  Primary ICD-10-CM: G47.00 ICD-9-CM: 780.52 Anxiety     ICD-10-CM: F41.9 ICD-9-CM: 300.00 Rheumatoid arthritis of wrist, unspecified laterality, unspecified rheumatoid factor presence (HCC)     ICD-10-CM: M06.9 ICD-9-CM: 714.0 Stenosis of right carotid artery     ICD-10-CM: I65.21 ICD-9-CM: 433.10 PAD (peripheral artery disease) (HCC)     ICD-10-CM: I73.9 ICD-9-CM: 443.9  Essential hypertension     ICD-10-CM: I10 
 ICD-9-CM: 401.9 Vitals BP Pulse Temp Resp Height(growth percentile) Weight(growth percentile) 116/54 75 97.7 °F (36.5 °C) (Oral) 16 5' 4\" (1.626 m) 147 lb (66.7 kg) SpO2 BMI OB Status Smoking Status 98% 25.23 kg/m2 Postmenopausal Current Every Day Smoker BMI and BSA Data Body Mass Index Body Surface Area  
 25.23 kg/m 2 1.74 m 2 Preferred Pharmacy Pharmacy Name Phone [place] [address], [address] [phone] Your Updated Medication List  
  
   
This list is accurate as of 9/20/18 10:08 AM.  Always use your most recent med list. amLODIPine 5 mg tablet Commonly known as:  Diya Spruce Take 1 Tab by mouth daily for 180 days. aspirin 325 mg tablet Commonly known as:  ASPIRIN Take 325 mg by mouth daily. atorvastatin 10 mg tablet Commonly known as:  LIPITOR  
TAKE 1 TABLET BY MOUTH DAILY  
  
 calcium carbonate-vitamin D3 600 mg (1,500 mg)-800 unit Chew Take  by mouth. FOLIC ACID PO Take 400 mcg by mouth daily. hydrALAZINE 100 mg tablet Commonly known as:  APRESOLINE  
TAKE 1 TABLET BY MOUTH THREE TIMES DAILY  
  
 lisinopril 20 mg tablet Commonly known as:  Marge Brionna Take 1 Tab by mouth daily for 180 days. multivitamin tablet Commonly known as:  ONE A DAY Take 1 Tab by mouth daily. ramelteon 8 mg tablet Commonly known as:  ROZEREM Take 1 Tab by mouth nightly for 90 days. verapamil  mg CR tablet Commonly known as:  CALAN-SR Take 1 Tab by mouth daily for 180 days. zinc 50 mg Tab tablet Take  by mouth. Prescriptions Sent to Pharmacy Refills  
 ramelteon (ROZEREM) 8 mg tablet 0 Sig: Take 1 Tab by mouth nightly for 90 days. Class: Normal  
 Pharmacy: [facility] #: [phone] Route: Oral  
  
Follow-up Instructions Return in about 2 months (around 11/20/2018), or if symptoms worsen or fail to improve, for insomnia. Patient Instructions For Sleep:  
Start taking ramelteon 8 mg 1.5-2 hours before bedtime Consider meditation, outlined below Consider yoga for beginners Continue to eat healthy, good water intake and limited caffeine intake Return in 8 weeks for follow up Insomnia: Care Instructions Your Care Instructions Insomnia is the inability to sleep well. It is a common problem for most people at some time. Insomnia may make it hard for you to get to sleep, stay asleep, or sleep as long as you need to. This can make you tired and grouchy during the day. It can also make you forgetful, less effective at work, and unhappy. Insomnia can be caused by conditions such as depression or anxiety. Pain can also affect your ability to sleep. When these problems are solved, the insomnia usually clears up. But sometimes bad sleep habits can cause insomnia. If insomnia is affecting your work or your enjoyment of life, you can take steps to improve your sleep. Follow-up care is a key part of your treatment and safety. Be sure to make and go to all appointments, and call your doctor if you are having problems. It's also a good idea to know your test results and keep a list of the medicines you take. How can you care for yourself at home? What to avoid · Do not have drinks with caffeine, such as coffee or black tea, for 8 hours before bed. · Do not smoke or use other types of tobacco near bedtime. Nicotine is a stimulant and can keep you awake. · Avoid drinking alcohol late in the evening, because it can cause you to wake in the middle of the night. · Do not eat a big meal close to bedtime. If you are hungry, eat a light snack. · Do not drink a lot of water close to bedtime, because the need to urinate may wake you up during the night. · Do not read or watch TV in bed. Use the bed only for sleeping and sexual activity. What to try · Go to bed at the same time every night, and wake up at the same time every morning. Do not take naps during the day. · Keep your bedroom quiet, dark, and cool. · Sleep on a comfortable pillow and mattress. · If watching the clock makes you anxious, turn it facing away from you so you cannot see the time. · If you worry when you lie down, start a worry book. Well before bedtime, write down your worries, and then set the book and your concerns aside. · Try meditation or other relaxation techniques before you go to bed. · If you cannot fall asleep, get up and go to another room until you feel sleepy. Do something relaxing. Repeat your bedtime routine before you go to bed again. · Make your house quiet and calm about an hour before bedtime. Turn down the lights, turn off the TV, log off the computer, and turn down the volume on music. This can help you relax after a busy day. When should you call for help? Watch closely for changes in your health, and be sure to contact your doctor if: 
  · Your efforts to improve your sleep do not work.  
  · Your insomnia gets worse.  
  · You have been feeling down, depressed, or hopeless or have lost interest in things that you usually enjoy. Where can you learn more? Go to http://afshan-dick.info/. Enter P513 in the search box to learn more about \"Insomnia: Care Instructions. \" Current as of: October 10, 2017 Content Version: 11.7 © 5180-4469 [employer]wise, Incorporated. Care instructions adapted under license by [employer] (which disclaims liability or warranty for this information). If you have questions about a medical condition or this instruction, always ask your healthcare professional. [address] any warranty or liability for your use of this information. Ramelteon (By mouth) Ramelteon (oo-RON-rfl-on) Treats insomnia (trouble falling asleep). Brand Name(s): Rozerem There may be other brand names for this medicine. When This Medicine Should Not Be Used: You should not use this medicine if you have had an allergic reaction to ramelteon, if you are also using fluvoxamine (Luvox®), or if you have severe liver disease. How to Use This Medicine:  
Tablet · Your doctor will tell you how much medicine to use. Do not use more than directed. · It is best to take this medicine no more than 30 minutes before you go to bed. After you take the medicine, do not engage in any other activity except getting ready for bed. · Do not take this medicine with or right after a high-fat meal. 
· Swallow the tablet whole. Do not break, crush, or chew it. · This medicine should come with a Medication Guide. Ask your pharmacist for a copy if you do not have one. If a dose is missed: · If you forget to take your medicine at bedtime and you cannot sleep, take it as soon as you remember the missed dose. If it is almost time for you to awaken, skip the missed dose and wait until the next night to take your medicine. Do not take extra medicine to make up for a missed dose. How to Store and Dispose of This Medicine: · Store the medicine in a closed container at room temperature, away from heat, moisture, and direct light. · Ask your pharmacist, doctor, or health caregiver about the best way to dispose of any outdated medicine or medicine no longer needed. · Keep all medicine out of the reach of children. Never share your medicine with anyone. Drugs and Foods to Avoid: Ask your doctor or pharmacist before using any other medicine, including over-the-counter medicines, vitamins, and herbal products. · Make sure your doctor knows if you are also using donepezil (Aricept®), doxepin (Valeria Suleimanang), fluconazole (Diflucan®), ketoconazole (Becca Ashwini), or rifampin (Rifadin®, Rimactane®). · Tell your doctor if you use anything else that makes you sleepy. Some examples are allergy medicine, narcotic pain medicine, and alcohol. · Do not drink alcohol while you are using this medicine. Warnings While Using This Medicine: · Make sure your doctor knows if you are pregnant or breastfeeding, or if you have liver disease, sleep apnea, or a breathing disorder such as COPD (chronic obstructive pulmonary disease). Tell your doctor if you have a history of depression or mental illness, or if you have ever had thoughts of hurting yourself. · This medicine may cause a serious type of allergic reaction called anaphylaxis. Anaphylaxis can be life-threatening and requires immediate medical attention. Stop taking this medicine and call your doctor right away if you have itching, hives, trouble breathing, or any swelling of your hands, face, mouth, or tongue when you take this medicine. · You might have mood or behavior changes with this medicine, such as feeling sad or hopeless, or getting upset easily. You could feel nervous or hostile. Some people become violent and want to hurt themselves or others. You might have too much energy, or see or hear unusual things. Call your doctor right away if you have any strange feelings, thoughts, or behaviors. · This medicine may cause sleep-related behaviors such as driving a car (sleep-driving), walking (sleep-walking), having sex, making phone calls, or preparing and eating food while you are asleep or not fully awake. If these unwanted effects occur, tell your doctor right away. · This medicine may cause some hormone changes. Talk to your doctor if you have any of these symptoms: a decreased interest in sex; problems getting pregnant; an irregular or change in menstrual periods; or discharge from the nipples (in women). · This medicine may make you dizzy or drowsy. Avoid driving, using machines, or doing anything else that could be dangerous if you are not alert. · Call your doctor if your symptoms do not improve or if they get worse. Possible Side Effects While Using This Medicine:  
Call your doctor right away if you notice any of these side effects: · Allergic reaction: Itching or hives, swelling in your face or hands, swelling or tingling in your mouth or throat, chest tightness, trouble breathing · Changes in behavior or thoughts of hurting yourself or others. · Changes in menstrual periods. · Discharge from the nipples in women. · Feeling depressed, anxious, or agitated. · Loss of interest in sex. · Seeing, hearing, or feeling things that are not there. · Unusual thoughts or behavior. · Unusual tiredness or weakness. · Worsening of insomnia (trouble sleeping). If you notice these less serious side effects, talk with your doctor: · Dizziness or drowsiness. · Headaches. · Nausea. If you notice other side effects that you think are caused by this medicine, tell your doctor. Call your doctor for medical advice about side effects. You may report side effects to FDA at 7-231-GVI-7062 © 2017 [facility] Information is for End User's use only and may not be sold, redistributed or otherwise used for commercial purposes. The above information is an  only. It is not intended as medical advice for individual conditions or treatments. Talk to your doctor, nurse or pharmacist before following any medical regimen to see if it is safe and effective for you. Introducing [facility]! Dear Karoline Sandifer: Thank you for requesting a [employer] account. Our records indicate that you already have an active [employer] account. You can access your account anytime at [email or web address] Did you know that you can access your hospital and ER discharge instructions at any time in [employer]? You can also review all of your test results from your hospital stay or ER visit. Additional Information If you have questions, please visit the Frequently Asked Questions section of the [employer] website at https://[employer]. "[employer]". [email or web address] Remember, [employer] is NOT to be used for urgent needs. For medical emergencies, dial 911. Now available from your iPhone and Android! Please provide this summary of care documentation to your next provider. Your primary care clinician is listed as Remus Challenger. If you have any questions after today's visit, please call [phone].

## 2018-09-20 NOTE — PROGRESS NOTES
Internal Medicine Follow Up Visit Note Chief Complaint Patient presents with  Anxiety  Sleep Problem  Weight Loss HPI: Pepito Souza a 57 y.o.  female with history significant for RA, HTN, DM diet controlled is here for the above complaint(s). Last seen 2018. Since last visit had right   no complications. Last visit with vascular Dr. Tretnon Greene and concern for need for stent in R CA, referred to different provider. Has seen by RA Dr. Richy Ross, no change to medications. Recrent abdominal cramping and loose stools 2018. Reports since then symptoms are resolved. Insomnia: Trouble staying asleep, unchanged since last visit. No help from melatonin 10 mg nightly. Sleeping on average 4 hours per night before waking up. Denies snoring, PND, orthopnea, AM headaches or witnessed apneic episodes. No TV/Phone/Reading in bed, no irregular bedtime. Exercising 10 minutes in morning and 10 minutes in afternoon. 1 8 ounce cup of coffee in AM. STOP-BANG: - snoring, + day time fatigue, - observied apneic episodes, + HTN, - BMI >35, + AGE >50, - neck size >17 in male >15 in female, - male gender. 3/8 low intermediate risk DANISH Anxiety: Emotionally feels better since last visit. Exercising more now. No significant anxiety currently Weight Loss: Since last visit weight is up 4 lbs. . Eating 3-4 x per day. Current Outpatient Prescriptions Medication Sig  
 ramelteon (ROZEREM) 8 mg tablet Take 1 Tab by mouth nightly for 90 days.  [START ON 10/20/2018] verapamil ER (CALAN-SR) 180 mg CR tablet Take 1 Tab by mouth daily.  [START ON 10/20/2018] amLODIPine (NORVASC) 5 mg tablet Take 1 Tab by mouth daily.  [START ON 10/20/2018] lisinopril (PRINIVIL, ZESTRIL) 20 mg tablet Take 1 Tab by mouth daily.  hydrALAZINE (APRESOLINE) 100 mg tablet TAKE 1 TABLET BY MOUTH THREE TIMES DAILY  atorvastatin (LIPITOR) 10 mg tablet TAKE 1 TABLET BY MOUTH DAILY  zinc 50 mg tab tablet Take  by mouth.  FOLIC ACID PO Take 830 mcg by mouth daily.  multivitamin (ONE A DAY) tablet Take 1 Tab by mouth daily.  aspirin (ASPIRIN) 325 mg tablet Take 325 mg by mouth daily.  calcium carbonate-vitamin D3 600 mg (1,500 mg)-800 unit chew Take  by mouth. No current facility-administered medications for this visit. Health Maintenance Topic Date Due  
 ZOSTER VACCINE AGE 60>  08/19/2013  Bone Densitometry (Dexa) Screening  10/19/2018  HEMOGLOBIN A1C Q6M  12/28/2018  MICROALBUMIN Q1  03/28/2019  LIPID PANEL Q1  03/28/2019  
 FOOT EXAM Q1  04/25/2019  
 EYE EXAM RETINAL OR DILATED Q1  05/10/2019  BREAST CANCER SCRN MAMMOGRAM  06/01/2019  
 DTaP/Tdap/Td series (2 - Td) 06/01/2021  COLONOSCOPY  06/04/2023  Hepatitis C Screening  Completed  Pneumococcal 19-64 Medium Risk  Completed  Influenza Age 5 to Adult  Completed Immunization History Administered Date(s) Administered  Influenza Vaccine 10/01/2017, 08/30/2018  Pneumococcal Polysaccharide (PPSV-23) 11/18/2010  Tdap 06/01/2011 Allergies and Medications: Reviewed and updated in EMR. Patient Active Problem List  
Diagnosis Code  Carotid artery stenosis I65.29  
 Diabetes type 2, controlled (HCC) E11.9  
 HLD (hyperlipidemia) E78.5  Hypertension I10  
 PAD (peripheral artery disease) (HCC) I73.9  Rheumatoid arthritis ([facility] [address] M06.9  S/P insertion of iliac artery stent Z95.828  
 Cataract H26.9  Diabetic polyneuropathy associated with type 2 diabetes mellitus (HCC) E11.42  
 Diverticulosis of large intestine without hemorrhage K57.30  Mild pulmonary hypertension (HCC) I27.20  
 Overweight (BMI 25.0-29. 9) E66.3 Family History, Social History, Past Medical History, Surgical History: Reviewed and updated in EMR as appropriate. OBJECTIVE:  
Visit Vitals  /54  Pulse 75  Temp 97.7 °F (36.5 °C) (Oral)  Resp 16  
  Ht 5' 4\" (1.626 m)  Wt 147 lb (66.7 kg)  SpO2 98%  BMI 25.23 kg/m2 BP Readings from Last 3 Encounters:  
09/20/18 116/54  
06/28/18 141/46  
06/06/18 138/60 Wt Readings from Last 3 Encounters:  
09/20/18 147 lb (66.7 kg) 06/27/18 143 lb 1.3 oz (64.9 kg) 06/06/18 144 lb (65.3 kg) General: Pleasant elderly woman in no acute distress HEENT: Head is atraumatic normo-cephalic. Neuro: Alert and oriented x3. Gait wnl  
MSE: mood euthymic, affect congruent and reactive. PHQ-9: 3, somewhat difficult 2/2 sleep PARAM-7: 0, not difficult at all 
 
6/6/2018 PHQ-9: 2, not difficult at all 
6/6/2018 PARAM-7: 2, not difficult at all Nursing Notes Reviewed. LABS/RADIOLOGICAL TESTS: 
 
 
Lab Results Component Value Date/Time WBC 5.0 03/28/2018 10:00 AM  
 HGB 11.1 (L) 06/28/2018 11:14 AM  
 HCT 31.6 (L) 06/28/2018 11:14 AM  
 PLATELET 583 38/15/3939 10:00 AM  
 
Lab Results Component Value Date/Time Sodium 136 06/27/2018 06:51 AM  
 Potassium 3.7 06/27/2018 06:51 AM  
 Chloride 101 06/27/2018 06:51 AM  
 CO2 23 04/03/2018 10:20 AM  
 CO2, TOTAL 22 06/27/2018 06:51 AM  
 Glucose 94 06/27/2018 06:51 AM  
 BUN 11 06/27/2018 06:51 AM  
 Creatinine 0.6 08/29/2018 09:04 AM  
 
All lab results and radiological studies were reviewed and discussed with the patient. ASSESSMENT/PLAN:   
  ICD-10-CM ICD-9-CM 1. Insomnia, unspecified type G47.00 780.52 ramelteon (ROZEREM) 8 mg tablet Sleep hygiene discussed 2. Anxiety:Resolved F41.9 300.00 Continue to monitor 3. Rheumatoid arthritis of wrist, unspecified laterality, unspecified rheumatoid factor presence (HCC) M06.9 714.0 Continue current mgmt F/u with RA specialist  
4. Stenosis of right carotid artery I65.21 433.10 Continue current mgmt F/u with vascular specialist  
5. PAD (peripheral artery disease) (HCC) I73.9 443.9 Continue current mgmt F/u with vascular specialist  
 6. Essential hypertension I10 401.9 verapamil ER (CALAN-SR) 180 mg CR tablet  
   amLODIPine (NORVASC) 5 mg tablet  
   lisinopril (PRINIVIL, ZESTRIL) 20 mg tablet 7. Overweight (BMI 25.0-29. 9) E66.3 278.02 Diet and exercise discussed BMI education provided in encounter Requested Prescriptions Signed Prescriptions Disp Refills  ramelteon (ROZEREM) 8 mg tablet 90 Tab 0 Sig: Take 1 Tab by mouth nightly for 90 days.  verapamil ER (CALAN-SR) 180 mg CR tablet 90 Tab 3 Sig: Take 1 Tab by mouth daily.  amLODIPine (NORVASC) 5 mg tablet 90 Tab 3 Sig: Take 1 Tab by mouth daily.  lisinopril (PRINIVIL, ZESTRIL) 20 mg tablet 90 Tab 3 Sig: Take 1 Tab by mouth daily. Patient verbalized understanding and agreement with the plan. Patient was given an after-visit summary. Follow-up Disposition: 
Return in about 2 months (around 11/20/2018), or if symptoms worsen or fail to improve, for insomnia. or sooner if worsening symptoms. More than 50% of this 25 min visit was spent counseling the patient face to face about etiology and treatment of health conditions outlined in assessment and plan. [place] Torres [address], [address] [place], [address] [place] [address] - [phone] Fax - [phone]

## 2018-09-21 ENCOUNTER — TELEPHONE (OUTPATIENT)
Dept: FAMILY MEDICINE CLINIC | Age: 63
End: 2018-09-21

## 2018-09-21 NOTE — TELEPHONE ENCOUNTER
PA for rozerem sent to pharmacy via covermymeds. Praveena Alexandre M.D.   [address], [address] [place] [address], [address] [place] [address] [phone] [place] [address] [phone]

## 2018-09-25 ENCOUNTER — TELEPHONE (OUTPATIENT)
Dept: FAMILY MEDICINE CLINIC | Age: 63
End: 2018-09-25

## 2018-09-25 DIAGNOSIS — G47.00 INSOMNIA, UNSPECIFIED TYPE: Primary | ICD-10-CM

## 2018-09-25 NOTE — TELEPHONE ENCOUNTER
Patient informed that the Rozerem was denied by insurance and that a referral for sleep medicine was given. Patient states that she appreciates the referral but that it is not necessary. Patient states that she would rather \"deal with it\" on her own.

## 2018-09-25 NOTE — TELEPHONE ENCOUNTER
Referral canceled. Carl Devlin M.D.   [address], [address] [place] [address], [address] [place] [address] [phone]  [place] [phone]

## 2018-09-25 NOTE — TELEPHONE ENCOUNTER
PA denied for rozerem 8 mg. Pharmacy requiring trial and failure of controlled substances prior to authrozing non controlled substance for patient as sleep aid including eszopiclone, temazepam, zaleplon, zolpidem. LPN please call patient and inform rozerem was denied by insurance. Referral made to sleep medicine. Can take up to 4 weeks for pt to be called regarding referral.     Malaika Ye M.D.   [address], [address] [place], [place] [phone]  [place] [phone]

## 2018-10-19 ENCOUNTER — TELEPHONE (OUTPATIENT)
Dept: FAMILY MEDICINE CLINIC | Age: 63
End: 2018-10-19

## 2018-10-19 NOTE — TELEPHONE ENCOUNTER
LPN please call patient and inform that if symptoms are severe will need to be seen in urgent care today. Okay to go to local urgent care office/now care for evaluation of mouth ulcers. Jodie Orourke M.D.   [address] [phone]  Y -   [phone]

## 2018-10-19 NOTE — TELEPHONE ENCOUNTER
Pt called and scheduled an appt to come in on Monday morning. Pt stated she has ulcerations in mouth extending down throat that has been present for a couple of weeks. Pt stated she has been gargling with salt water and it is not helping symptoms. Pt stated symptoms are getting worse.

## 2018-10-22 ENCOUNTER — OFFICE VISIT (OUTPATIENT)
Dept: FAMILY MEDICINE CLINIC | Age: 63
End: 2018-10-22

## 2018-10-22 VITALS
SYSTOLIC BLOOD PRESSURE: 144 MMHG | DIASTOLIC BLOOD PRESSURE: 57 MMHG | HEIGHT: 64 IN | HEART RATE: 77 BPM | OXYGEN SATURATION: 97 % | BODY MASS INDEX: 24.07 KG/M2 | TEMPERATURE: 96.9 F | WEIGHT: 141 LBS

## 2018-10-22 DIAGNOSIS — H61.23 BILATERAL IMPACTED CERUMEN: ICD-10-CM

## 2018-10-22 DIAGNOSIS — D61.818 PANCYTOPENIA (HCC): ICD-10-CM

## 2018-10-22 DIAGNOSIS — K12.1 ORAL ULCERATION: ICD-10-CM

## 2018-10-22 DIAGNOSIS — K12.1 ORAL ULCERATION: Primary | ICD-10-CM

## 2018-10-22 PROBLEM — K13.79 RECURRENT ORAL ULCERS: Status: ACTIVE | Noted: 2018-10-22

## 2018-10-22 RX ORDER — PREDNISONE 10 MG/1
TABLET ORAL
COMMUNITY
End: 2018-11-28

## 2018-10-22 RX ORDER — HYDROXYCHLOROQUINE SULFATE 200 MG/1
200 TABLET, FILM COATED ORAL DAILY
Refills: 5 | COMMUNITY
Start: 2018-10-13 | End: 2018-11-28

## 2018-10-22 RX ORDER — FOLIC ACID 1 MG/1
TABLET ORAL
Refills: 3 | COMMUNITY
Start: 2018-10-12 | End: 2019-02-05

## 2018-10-22 RX ORDER — CLINDAMYCIN HYDROCHLORIDE 300 MG/1
300 CAPSULE ORAL 3 TIMES DAILY
Qty: 30 CAP | Refills: 0 | Status: SHIPPED | OUTPATIENT
Start: 2018-10-22 | End: 2018-11-01

## 2018-10-22 RX ORDER — ATORVASTATIN CALCIUM 80 MG/1
80 TABLET, FILM COATED ORAL DAILY
COMMUNITY

## 2018-10-22 NOTE — PATIENT INSTRUCTIONS
Take clindamycin as prescribed  We are sending a referral to [facility] Th[facility] specialist . Vicki Moss should be called about this in 1-2 days. If no word in 2 days please give us a call to follow up         Stomatitis: Care Instructions  Your Care Instructions  Stomatitis is swelling and redness of the lining of your mouth. It can cause painful sores that can make it hard for you to eat, drink, or swallow. Stomatitis may be caused by a viral or bacterial infection, a disease, or not taking care of your teeth and gums properly. Other causes include reactions to smoking, burns from hot food or drinks, or an allergic reaction. Allergy causes may be a result of flavorings such as spearmint or cinnamon that are used in chewing gum, toothpaste, soft drinks, candies, and other products. Your doctor may prescribe pain medicines or special mouth rinses. He or she may tell you to quit using some products that may be causing the sores. It can take up to 2 weeks for the sores to heal.  Some people with stomatitis also get a yeast infection of the mouth, called thrush. Medicines can treat this problem. Follow-up care is a key part of your treatment and safety. Be sure to make and go to all appointments, and call your doctor if you are having problems. It's also a good idea to know your test results and keep a list of the medicines you take. How can you care for yourself at home? · Be safe with medicines. Read and follow all instructions on the label. ? If the doctor gave you a prescription medicine for pain, take it as prescribed. ? If you are not taking a prescription pain medicine, ask your doctor if you can take an over-the-counter medicine. · If your doctor prescribed antibiotics, take them as directed. Do not stop taking them just because you feel better. You need to take the full course of antibiotics. · Use prescription mouth rinses as prescribed. Ask your doctor if you can freeze the mouth rinse in an ice cube tray. Sucking on a frozen cube of the mouth rinse can help ease the pain. · Make a rinse to keep your mouth from getting dry. Add 1 teaspoon baking soda and ½ teaspoon salt to a quart of water. Use it to rinse your mouth 4 to 6 times each day. Spit out the rinse. Do not swallow it. · Do not use a mouthwash or other over-the-counter rinse with alcohol. These can dry out your mouth or cause more pain. · If your doctor gave you mouthwash or lozenges to treat your yeast infection, use them as directed. · Eat soft foods such as mashed potatoes and other cooked vegetables, noodles, applesauce, clear broth soups, yogurt, and cottage cheese. You can get extra protein by adding protein powder to milk shakes or breakfast drinks. Avoid eating spicy or crunchy foods. · Try eating cold foods such as ice cream or yogurt. · Avoid drinking high-acid juices such as orange, grapefruit, and cranberry juices. · Drink plenty of fluids to prevent dehydration. Drinking through a straw may help with pain. · Practice good oral hygiene. Use a very soft toothbrush to brush your teeth at least two times a day. Or use your finger to brush your teeth if your mouth is sore. When should you call for help? Call your doctor now or seek immediate medical care if:    · You have new or worse symptoms of infection, such as:  ? Increased pain, swelling, warmth, or redness. ? Red streaks leading from the area. ? Pus draining from the area. ? A fever.    Watch closely for changes in your health, and be sure to contact your doctor if:    · You do not get better as expected. Where can you learn more? Go to http://afshan-dick.info/. Enter N413 in the search box to learn more about \"Stomatitis: Care Instructions. \"  Current as of: March 28, 2018  Content Version: 11.8  © 5584-2736 [employer].  Care instructions adapted under license by [employer] (which disclaims liability or warranty for this information). If you have questions about a medical condition or this instruction, always ask your healthcare professional. [place] [address] any warranty or liability for your use of this information.

## 2018-10-22 NOTE — PROGRESS NOTES
Internal Medicine Follow Up Visit Note    Chief Complaint   Patient presents with    Mouth Lesions     Patient has had mouth ulcers for about 2 months that have become worse in the past 2 weeks. She states that she feels a tickle on the left side and it is then followed by a cough and sneeze. HPI:  Sarahy Willett is a 70 y.o.  female with history significant for RA, HTN, DM diet controlled is here for the above complaint(s)is here for the above complaint(s). Last seen 2018. Oral ulcers started 3 months ago. Had a history of similar many years ago. Worse over past 2 weeks. Feels possible drainage in back of throat. Has attacks of \"tickle in throat\" followed by coughing, sneezing and water eyes for years, increasing over the past few months. No rhinorrhea or sinus congestion. No fevers or chills. Pain with eating. PVD: Seen by vascular and s/p  with residual stenosis, recommended stent placement. Awaiting record. Dr. Oddis Goldberg increased atorvastatin to 80 mg daily. Rheumatology: seen and found to have pancytopenia. Rheumatologist thinks related to methotrexate. No prednisone in past month. Increased bruising over the past few months. No nose bleeds. Current Outpatient Medications   Medication Sig    atorvastatin (LIPITOR) 80 mg tablet Take 80 mg by mouth daily.  clindamycin (CLEOCIN) 300 mg capsule Take 1 Cap by mouth three (3) times daily for 10 days.  verapamil ER (CALAN-SR) 180 mg CR tablet Take 1 Tab by mouth daily.  amLODIPine (NORVASC) 5 mg tablet Take 1 Tab by mouth daily.  lisinopril (PRINIVIL, ZESTRIL) 20 mg tablet Take 1 Tab by mouth daily.  hydrALAZINE (APRESOLINE) 100 mg tablet TAKE 1 TABLET BY MOUTH THREE TIMES DAILY    zinc 50 mg tab tablet Take  by mouth.  multivitamin (ONE A DAY) tablet Take 1 Tab by mouth daily.  aspirin (ASPIRIN) 325 mg tablet Take 325 mg by mouth daily.     calcium carbonate-vitamin D3 600 mg (1,500 mg)-800 unit chew Take  by mouth.  predniSONE (DELTASONE) 10 mg tablet prednisone 10 mg tabs    hydroxychloroquine (PLAQUENIL) 200 mg tablet Take 200 mg by mouth daily.  folic acid (FOLVITE) 1 mg tablet TAKE 1 TABLET BY MOUTH ONCE DAILY    ramelteon (ROZEREM) 8 mg tablet Take 1 Tab by mouth nightly for 90 days. No current facility-administered medications for this visit. Health Maintenance   Topic Date Due    Shingrix Vaccine Age 49> (1 of 2) 10/19/2003    Bone Densitometry (Dexa) Screening  10/19/2018    Pneumococcal 65+ Low/Medium Risk (1 of 2 - PCV13) 10/19/2018    HEMOGLOBIN A1C Q6M  12/28/2018    MICROALBUMIN Q1  03/28/2019    LIPID PANEL Q1  03/28/2019    FOOT EXAM Q1  04/25/2019    EYE EXAM RETINAL OR DILATED Q1  05/10/2019    BREAST CANCER SCRN MAMMOGRAM  06/01/2019    GLAUCOMA SCREENING Q2Y  05/10/2020    DTaP/Tdap/Td series (2 - Td) 06/01/2021    COLONOSCOPY  06/04/2023    Hepatitis C Screening  Completed    Influenza Age 5 to Adult  Completed     Immunization History   Administered Date(s) Administered    Influenza Vaccine 10/01/2017, 08/30/2018    Pneumococcal Polysaccharide (PPSV-23) 11/18/2010    Tdap 06/01/2011       Allergies and Medications: Reviewed and updated in EMR. Patient Active Problem List   Diagnosis Code    Carotid artery stenosis I65.29    Diabetes type 2, controlled ([facility] [address] E11.9    HLD (hyperlipidemia) E78.5    Hypertension I10    PAD (peripheral artery disease) (HCC) I73.9    Rheumatoid arthritis ([address] M06.9    S/P insertion of iliac artery stent Z95.828    Cataract H26.9    Diabetic polyneuropathy associated with type 2 diabetes mellitus ([address] E11.42    Diverticulosis of large intestine without hemorrhage K57.30    Mild pulmonary hypertension (HCC) I27.20    Overweight (BMI 25.0-29. 9) E66.3    Recurrent oral ulcers K13.79    Pancytopenia (HCC) D61.818    Bilateral impacted cerumen H61.23       Family History, Social History, Past Medical History, Surgical History: Reviewed and updated in EMR as appropriate. OBJECTIVE:   Visit Vitals  /57   Pulse 77   Temp 96.9 °F (36.1 °C)   Ht 5' 4\" (1.626 m)   Wt 141 lb (64 kg)   SpO2 97%   BMI 24.20 kg/m²        BP Readings from Last 3 Encounters:   10/22/18 144/57   09/20/18 116/54   06/28/18 141/46     Wt Readings from Last 3 Encounters:   10/22/18 141 lb (64 kg)   09/20/18 147 lb (66.7 kg)   06/27/18 143 lb 1.3 oz (64.9 kg)       General: Pleasant adult woman in no acute distress  Head/ears/eyes/nose: Head is atraumatic normo-cephalic. Pupils equally round and reactive to light, extraocular muscle intact, conjunctiva clear, non-icterus. Ears bilaterally cerumen impaction, unable to view TM. Nares bilaterally mucosal membranes moist, no erythema. Nasal turbinates non-erythematous, non-boggy. Clear/white nasal discharge. Mouth: Adherent mucus posterior oropharynx and neha-uvula. Shallow ulcerations posterior oropharynx and soft/hard palate L>R. Neck: Supple, no LAD, no palpable thyromegaly. CVS:  Heart regular, rate, and rhythm. Audible S1 and S2. PULM: Lungs clear to auscultation bilaterally. No wheezes, rales or rhonchi. Neuro: Alert and oriented x3. Skin: Scattered ecchymosis bilateral arms. Nursing Notes Reviewed.     LABS/RADIOLOGICAL TESTS:    Lab Results   Component Value Date/Time    WBC 2.6 (L) 10/18/2018 03:16 PM    HGB 10.4 (L) 10/18/2018 03:16 PM    HCT 30.1 (L) 10/18/2018 03:16 PM    PLATELET 341 (L) 73/98/4110 03:16 PM     Lab Results   Component Value Date/Time    Sodium 136 06/27/2018 06:51 AM    Potassium 3.7 06/27/2018 06:51 AM    Chloride 101 06/27/2018 06:51 AM    CO2 23 04/03/2018 10:20 AM    CO2, TOTAL 22 06/27/2018 06:51 AM    Glucose 94 06/27/2018 06:51 AM    BUN 11 06/27/2018 06:51 AM    Creatinine 0.6 08/29/2018 09:04 AM     Lab Results   Component Value Date/Time    Cholesterol, total 159 03/28/2018 10:00 AM    HDL Cholesterol 97 (H) 03/28/2018 10:00 AM    LDL, calculated 36 (L) 03/28/2018 10:00 AM    Triglyceride 128 03/28/2018 10:00 AM     All lab results and radiological studies were reviewed and discussed with the patient. ASSESSMENT/PLAN:      ICD-10-CM ICD-9-CM    1. Oral ulceration K12.1 528.9 REFERRAL TO ENT-OTOLARYNGOLOGY      clindamycin (CLEOCIN) 300 mg capsule      THROAT CULTURE   2. Pancytopenia ([address] P93.369 284.19 F/u with rheumatology  Consider hematology/oncology referral if no improvement   3. Bilateral impacted cerumen H61.23 380.4 [place]  Return in 1 week for RN visit for ear cleaning       Requested Prescriptions     Signed Prescriptions Disp Refills    clindamycin (CLEOCIN) 300 mg capsule 30 Cap 0     Sig: Take 1 Cap by mouth three (3) times daily for 10 days. Patient verbalized understanding and agreement with the plan. Patient was given an after-visit summary. Follow-up Disposition:  Return in about 4 weeks (around 11/19/2018) for oral ulcers. or sooner if worsening symptoms. More than 50% of this 25 min visit was spent counseling the patient face to face about etiology and treatment of health conditions outlined in assessment and plan. Tereasa Duverney M.D.   [address], [address] [place] [address] [phone]  X [phone]   [phone]

## 2018-10-22 NOTE — PROGRESS NOTES
Chief Complaint   Patient presents with    Mouth Lesions     Patient has had mouth ulcers for about 2 months that have become worse in the past 2 weeks. She states that she feels a tickle on the left side and it is then followed by a cough and sneeze. ..1. Have you been to the ER, urgent care clinic since your last visit? Hospitalized since your last visit? No    2. Have you seen or consulted any other health care providers outside of the [address] [place] [address] since your last visit? Include any pap smears or colon screening.  No

## 2018-10-23 NOTE — PROGRESS NOTES
[facility]  [address]   [address]   [place], [address]  Phone: [phone]  Fax: [phone]

## 2018-10-25 LAB — RESULT: NORMAL

## 2018-11-20 ENCOUNTER — OFFICE VISIT (OUTPATIENT)
Dept: FAMILY MEDICINE CLINIC | Age: 63
End: 2018-11-20

## 2018-11-20 VITALS
WEIGHT: 145 LBS | DIASTOLIC BLOOD PRESSURE: 53 MMHG | SYSTOLIC BLOOD PRESSURE: 121 MMHG | BODY MASS INDEX: 24.75 KG/M2 | TEMPERATURE: 97.6 F | HEART RATE: 64 BPM | OXYGEN SATURATION: 98 % | RESPIRATION RATE: 16 BRPM | HEIGHT: 64 IN

## 2018-11-20 DIAGNOSIS — D61.818 PANCYTOPENIA (HCC): ICD-10-CM

## 2018-11-20 DIAGNOSIS — I27.20 MILD PULMONARY HYPERTENSION (HCC): ICD-10-CM

## 2018-11-20 DIAGNOSIS — I10 ESSENTIAL HYPERTENSION: ICD-10-CM

## 2018-11-20 DIAGNOSIS — E11.9 CONTROLLED TYPE 2 DIABETES MELLITUS WITHOUT COMPLICATION, WITHOUT LONG-TERM CURRENT USE OF INSULIN (HCC): ICD-10-CM

## 2018-11-20 DIAGNOSIS — Z23 ENCOUNTER FOR IMMUNIZATION: ICD-10-CM

## 2018-11-20 DIAGNOSIS — E78.5 HYPERLIPIDEMIA, UNSPECIFIED HYPERLIPIDEMIA TYPE: ICD-10-CM

## 2018-11-20 DIAGNOSIS — C06.0 SQUAMOUS CELL CARCINOMA OF ORAL MUCOSA (HCC): ICD-10-CM

## 2018-11-20 DIAGNOSIS — I65.29 STENOSIS OF CAROTID ARTERY, UNSPECIFIED LATERALITY: ICD-10-CM

## 2018-11-20 DIAGNOSIS — G47.00 INSOMNIA, UNSPECIFIED TYPE: ICD-10-CM

## 2018-11-20 DIAGNOSIS — I73.9 PAD (PERIPHERAL ARTERY DISEASE) (HCC): ICD-10-CM

## 2018-11-20 DIAGNOSIS — M06.9 RHEUMATOID ARTHRITIS OF WRIST, UNSPECIFIED LATERALITY, UNSPECIFIED RHEUMATOID FACTOR PRESENCE: ICD-10-CM

## 2018-11-20 DIAGNOSIS — Z00.00 WELCOME TO MEDICARE PREVENTIVE VISIT: Primary | ICD-10-CM

## 2018-11-20 PROBLEM — H61.23 BILATERAL IMPACTED CERUMEN: Status: RESOLVED | Noted: 2018-10-22 | Resolved: 2018-11-20

## 2018-11-20 PROBLEM — E66.3 OVERWEIGHT (BMI 25.0-29.9): Status: RESOLVED | Noted: 2018-09-20 | Resolved: 2018-11-20

## 2018-11-20 RX ORDER — ZOLPIDEM TARTRATE 5 MG/1
5 TABLET ORAL
Qty: 30 TAB | Refills: 0 | Status: SHIPPED | OUTPATIENT
Start: 2018-12-19 | End: 2018-11-28

## 2018-11-20 RX ORDER — ZOLPIDEM TARTRATE 5 MG/1
5 TABLET ORAL
Qty: 30 TAB | Refills: 0 | Status: SHIPPED | OUTPATIENT
Start: 2018-11-20 | End: 2018-12-20

## 2018-11-20 NOTE — PROGRESS NOTES
Internal Medicine Follow Up Visit Note Chief Complaint Patient presents with [facility] Annual Wellness Visit  Cancer HPI:  Micheal James is a 70 y.o.  female with history significant for PAD, RA, HTN, DM diet controlled is here for the above complaint(s). Squamous Cell Carcinoma diagnosed via biopsy buccal mucosa 10/24/2018 via ENT, seen by radiation oncology and plans for further staging with PET scan, f/u with ENT and oncology as well. Plans to have dental extraction prior to radiation, only on aspirin 325 mg daily. Insomnia: Trouble falling asleep worse since diagnosis of above. No help with melatonin trials. Current Outpatient Medications Medication Sig  
 zolpidem (AMBIEN) 5 mg tablet Take 1 Tab by mouth nightly as needed for Sleep for up to 30 days. Max Daily Amount: 5 mg.  
 [START ON 12/19/2018] zolpidem (AMBIEN) 5 mg tablet Take 1 Tab by mouth nightly as needed for Sleep. Max Daily Amount: 5 mg.  atorvastatin (LIPITOR) 80 mg tablet Take 80 mg by mouth daily.  folic acid (FOLVITE) 1 mg tablet TAKE 1 TABLET BY MOUTH ONCE DAILY  verapamil ER (CALAN-SR) 180 mg CR tablet Take 1 Tab by mouth daily.  amLODIPine (NORVASC) 5 mg tablet Take 1 Tab by mouth daily.  lisinopril (PRINIVIL, ZESTRIL) 20 mg tablet Take 1 Tab by mouth daily.  hydrALAZINE (APRESOLINE) 100 mg tablet TAKE 1 TABLET BY MOUTH THREE TIMES DAILY  zinc 50 mg tab tablet Take  by mouth.  multivitamin (ONE A DAY) tablet Take 1 Tab by mouth daily.  aspirin (ASPIRIN) 325 mg tablet Take 325 mg by mouth daily.  calcium carbonate-vitamin D3 600 mg (1,500 mg)-800 unit chew Take  by mouth.  predniSONE (DELTASONE) 10 mg tablet prednisone 10 mg tabs  hydroxychloroquine (PLAQUENIL) 200 mg tablet Take 200 mg by mouth daily. No current facility-administered medications for this visit. Health Maintenance Topic Date Due  Shingrix Vaccine Age 50> (1 of 2) 10/19/2003  Bone Densitometry (Dexa) Screening  10/19/2018  Pneumococcal 65+ Low/Medium Risk (1 of 2 - PCV13) 10/19/2018  HEMOGLOBIN A1C Q6M  12/28/2018  MICROALBUMIN Q1  03/28/2019  LIPID PANEL Q1  03/28/2019  
 FOOT EXAM Q1  04/25/2019  
 EYE EXAM RETINAL OR DILATED Q1  05/10/2019  BREAST CANCER SCRN MAMMOGRAM  06/01/2019  MEDICARE YEARLY EXAM  11/21/2019  GLAUCOMA SCREENING Q2Y  05/10/2020  
 DTaP/Tdap/Td series (2 - Td) 06/01/2021  COLONOSCOPY  06/04/2023  Hepatitis C Screening  Completed  Influenza Age 5 to Adult  Completed Immunization History Administered Date(s) Administered  Influenza Vaccine 10/01/2017, 08/30/2018  Pneumococcal Polysaccharide (PPSV-23) 11/18/2010  Tdap 06/01/2011 Allergies and Medications: Reviewed and updated in EMR. Patient Active Problem List  
Diagnosis Code  Carotid artery stenosis I65.29  
 Diabetes type 2, controlled ([facility]) E11.9  
 HLD (hyperlipidemia) E78.5  Hypertension I10  
 PAD (peripheral artery disease) ([facility]) I73.9  Rheumatoid arthritis ([facility] M06.9  S/P insertion of iliac artery stent Z95.828  
 Cataract H26.9  Diabetic polyneuropathy associated with type 2 diabetes mellitus (HCC) E11.42  
 Diverticulosis of large intestine without hemorrhage K57.30  Mild pulmonary hypertension (HCC) I27.20  Squamous cell carcinoma of oral mucosa ([facility]) C06.0  Pancytopenia ([facility] B96.159 Family History, Social History, Past Medical History, Surgical History: Reviewed and updated in EMR as appropriate. OBJECTIVE:  
Visit Vitals /53 Pulse 64 Temp 97.6 °F (36.4 °C) Resp 16 Ht 5' 4\" (1.626 m) Wt 145 lb (65.8 kg) SpO2 98% BMI 24.89 kg/m² BP Readings from Last 3 Encounters:  
11/20/18 121/53  
10/22/18 144/57  
09/20/18 116/54 Wt Readings from Last 3 Encounters:  
11/20/18 145 lb (65.8 kg) 10/22/18 141 lb (64 kg) 09/20/18 147 lb (66.7 kg) General: Pleasant adult woman in no acute distressral UE/LE proximal and distal muscle groups Nursing Notes Reviewed. LABS/RADIOLOGICAL TESTS: 
 
11/14/2018 Dr. Brenda Flores Rheumatology WBC/Hb/Hct/Plt: 5.6, 11.9/35.7, 168 Lab Results Component Value Date/Time WBC 2.6 (L) 10/18/2018 03:16 PM  
 HGB 10.4 (L) 10/18/2018 03:16 PM  
 HCT 30.1 (L) 10/18/2018 03:16 PM  
 PLATELET 290 (L) 96/28/8323 03:16 PM  
 
Lab Results Component Value Date/Time Sodium 136 06/27/2018 06:51 AM  
 Potassium 3.7 06/27/2018 06:51 AM  
 Chloride 101 06/27/2018 06:51 AM  
 CO2 23 04/03/2018 10:20 AM  
 CO2, TOTAL 22 06/27/2018 06:51 AM  
 Glucose 94 06/27/2018 06:51 AM  
 BUN 11 06/27/2018 06:51 AM  
 Creatinine 0.8 11/08/2018 08:59 AM  
 
Lab Results Component Value Date/Time Cholesterol, total 159 03/28/2018 10:00 AM  
 HDL Cholesterol 97 (H) 03/28/2018 10:00 AM  
 LDL, calculated 36 (L) 03/28/2018 10:00 AM  
 Triglyceride 128 03/28/2018 10:00 AM  
All lab results and radiological studies were reviewed and discussed with the patient. ASSESSMENT/PLAN:   
  ICD-10-CM ICD-9-CM 1. Welcome to Medicare preventive visit Z00.00 V70.0 PNEUMOCOCCAL CONJ VACCINE 13 VALENT IM  
2. Squamous cell carcinoma of oral mucosa (HCC) C06.0 145.9 Continue current mgmt F/u with rad onc/oncology/ENT 3. Encounter for immunization Z23 V03.89 PNEUMOCOCCAL CONJ VACCINE 13 VALENT IM  
4. Insomnia, unspecified type G47.00 780.52 zolpidem (AMBIEN) 5 mg tablet  
   zolpidem (AMBIEN) 5 mg tablet 5. Stenosis of carotid artery, unspecified laterality I65.29 433.10 Continue current mgmt 6. Mild pulmonary hypertension (HCC) I27.20 416.8 Continue current mgmt 7. PAD (peripheral artery disease) ([facility]) I73.9 443.9 LIPID PANEL 8. Essential hypertension P05 295.2 METABOLIC PANEL, COMPREHENSIVE 9.  Controlled type 2 diabetes mellitus without complication, without long-term current use of insulin (HCC) E11.9 250.00 HEMOGLOBIN A1C WITH EAG 
 Continue diet control 10. Hyperlipidemia, unspecified hyperlipidemia type E78.5 272.4 LIPID PANEL 11. Pancytopenia [facility]): Resolved B96.161 284.19 Likely associated with methotrexate Continue to monitor 12. Rheumatoid arthritis of wrist, unspecified laterality, unspecified rheumatoid factor presence (HCC) M06.9 714.0 continue current mgmt F/u with rheumatology Requested Prescriptions Signed Prescriptions Disp Refills  zolpidem (AMBIEN) 5 mg tablet 30 Tab 0 Sig: Take 1 Tab by mouth nightly as needed for Sleep for up to 30 days. Max Daily Amount: 5 mg.  zolpidem (AMBIEN) 5 mg tablet 30 Tab 0 Sig: Take 1 Tab by mouth nightly as needed for Sleep. Max Daily Amount: 5 mg. Patient verbalized understanding and agreement with the plan. Patient was given an after-visit summary. Follow-up Disposition: 
Return in about 3 weeks (around 12/11/2018), or if symptoms worsen or fail to improve, for insomnia. or sooner if worsening symptoms. More than 50% of this 40 min visit was spent counseling the patient face to face about etiology and treatment of health conditions outlined in assessment and plan. Khushboo Mayorga M.D. [address], [address] [place], [address] [place] [address] - [phone] Fax - [phone] or [phone] PROGRESS NOTES WELCOME TO MEDICARE This is a \"Welcome to [facility]" Initial Preventive Physical Examination (IPPE) I have reviewed the patient's medical history in detail and updated the computerized patient record. Patient Active Problem List  
Diagnosis Code  Carotid artery stenosis I65.29  
 Diabetes type 2, controlled (HCC) E11.9  
 HLD (hyperlipidemia) E78.5  Hypertension I10  
 PAD (peripheral artery disease) ([facility]) I73.9  Rheumatoid arthritis ([facility] M06.9  S/P insertion of iliac artery stent Z95.828  
 Cataract H26.9  Diabetic polyneuropathy associated with type 2 diabetes mellitus (HCC) E11.42  
  Diverticulosis of large intestine without hemorrhage K57.30  Mild pulmonary hypertension (HCC) I27.20  Squamous cell carcinoma of oral mucosa (HCC) C06.0  Pancytopenia ([facility] H52.697 Depression risk factor summary:  
  
Patient Health Questionnaire (PHQ-9) Over the last 2 weeks, how often have you been bothered by any of the following problems? · Little interest or pleasure in doing things? · Several days. [1] · Feeling down, depressed, or hopeless? · Several days. [1] · Trouble falling or staying asleep, or sleeping too much? · Nearly every day. [3] · Feeling tired or having little energy? · Several days. [1] · Poor appetite or overeating? · Not at all. [0] · Feeling bad about yourself - or that you are a failure or have let yourself or your family down? · Not at all. [0] · Trouble concentrating on things, such as reading the newspaper or watching television? · Not at all. [0] · Moving or speaking so slowly that other people could have noticed? Or the opposite - being so fidgety or restless that you have been moving around a lot more than usual? 
· Not at all. [0] · Thoughts that you would be better off dead, or of hurting yourself in some way? · Not at all. [0] Total Score: 6/27 Depression Severity:  
0-4 None, 5-9 mild, 10-14 moderate, 15-19 moderately severe, 20-27 severe. Functional Ability and Level of Safety: 
 
Hearing Loss Hearing is good. Activities of Daily Living Self-care. Requires assistance with: no ADLs Fall Risk No fall risk factors Abuse Screen Patient is not abused None Adult Nutrition Screen No risk factors noted. Review of Systems A comprehensive review of systems was negative except for that written in the HPI. Physical Exam  
 
 
Visual Acuity: Corrected:            L  20/30            R 20/40  Both 20/25 Visit Vitals /53 Pulse 64 Temp 97.6 °F (36.4 °C) Resp 16 Ht 5' 4\" (1.626 m) Wt 145 lb (65.8 kg) SpO2 98% BMI 24.89 kg/m² General:  Alert, cooperative, no distress, appears stated age. Head:  Normocephalic, without obvious abnormality, atraumatic. Eyes:  Conjunctivae/corneas clear. PERRL, EOMs intact. Fundi benign. Ears:  Normal TMs and external ear canals both ears. Nose: Nares normal. Septum midline. Mucosa normal. No drainage or sinus tenderness. Throat: Ulcerations buccal mucosa and soft palate. Teeth and gums normal.  
Neck: Supple, symmetrical, trachea midline, no adenopathy, thyroid: no enlargement/tenderness/nodules, no carotid bruit and no JVD. Linear healed scars bilateral overlying carotid anterior to SCM. Back:   Symmetric, no curvature. ROM normal. No CVA tenderness. Lungs:   Clear to auscultation bilaterally. Chest wall:  No tenderness or deformity. Heart:  Regular rate and rhythm, S1, S2 normal, no murmur, click, rub or gallop. Abdomen:   Soft, non-tender. Bowel sounds normal. No masses,  No organomegaly. Extremities: Extremities normal, atraumatic, no cyanosis or edema. Pulses: 2+ and symmetric all extremities. Skin: Skin color, texture, turgor normal. No rashes or lesions. Lymph nodes: Cervical, supraclavicular, and axillary nodes normal.  
Neurologic: CNII-XII grossly intact. Normal strength, sensation and reflexes throughout. EK2018; sinus rhythm. Per my read. Assessment/Plan:  
Education and counseling provided: 
Are appropriate based on today's review and evaluation End-of-Life planning (with patient's consent) Pneumococcal Vaccine ASSESSMENT/PLAN:   
  ICD-10-CM ICD-9-CM 1. Welcome to Medicare preventive visit Z00.00 V70.0 PNEUMOCOCCAL CONJ VACCINE 13 VALENT IM  
2. Squamous cell carcinoma of oral mucosa (HCC) C06.0 145.9 Continue current mgmt F/u with rad onc/oncology/ENT 3.  Encounter for immunization Z23 V03.89 PNEUMOCOCCAL CONJ VACCINE 13 VALENT IM  
 4. Insomnia, unspecified type G47.00 780.52 zolpidem (AMBIEN) 5 mg tablet  
   zolpidem (AMBIEN) 5 mg tablet 5. Stenosis of carotid artery, unspecified laterality I65.29 433.10 Continue current mgmt 6. Mild pulmonary hypertension (HCC) I27.20 416.8 Continue current mgmt 7. PAD (peripheral artery disease) ([facility]) I73.9 443.9 LIPID PANEL 8. Essential hypertension V78 240.3 METABOLIC PANEL, COMPREHENSIVE 9. Controlled type 2 diabetes mellitus without complication, without long-term current use of insulin ([facility]) E11.9 250.00 HEMOGLOBIN A1C WITH EAG Continue diet control 10. Hyperlipidemia, unspecified hyperlipidemia type E78.5 272.4 LIPID PANEL 11. Pancytopenia [facility]): Resolved S85.615 284.19 Likely associated with methotrexate Continue to monitor 12. Rheumatoid arthritis of wrist, unspecified laterality, unspecified rheumatoid factor presence ([facility]) M06.9 714.0 continue current mgmt F/u with rheumatology AVS with health care plan given to patient Denny Kwan M.D. [address], [address] [place], [address] [place] [address] Ph - [phone] Fax - [phone] or [phone]

## 2018-11-20 NOTE — ACP (ADVANCE CARE PLANNING)
Advance Care Planning (ACP) Provider Note - Comprehensive     Date of ACP Conversation: 11/20/18  Persons included in Conversation:  patient  Length of ACP Conversation in minutes:  <16 minutes (Non-Billable)    Authorized Decision Maker (if patient is incapable of making informed decisions): This person is:  N/A          General ACP for ALL Patients with Decision Making Capacity:   Importance of advance care planning, including choosing a healthcare agent to communicate patient's healthcare decisions if patient lost the ability to make decisions, such as after a sudden illness or accident    Review of Existing Advance Directive:  n/a    For Serious or Chronic Illness:  Understanding of medical condition    Understanding of CPR, goals and expected outcomes, benefits and burdens discussed.     Interventions Provided:  Recommended completion of Advance Directive form after review of ACP materials and conversation with prospective healthcare agent

## 2018-11-20 NOTE — PATIENT INSTRUCTIONS
BRING ADVANCED CARE PLANNING FORM COPY TO OFFICE FOR OUR RECORDS Return ON Friday to give blood after fasting 8 hours. No food or coffee. Water and medicines are okay. Lab opens from 8:30AM to 3PM. Schedule at . FOR SLEEP: 
Start taking Ambien 5 mg nighty as needed Schedule of Personalized Health Plan The best way to stay healthy is to live a healthy lifestyle. A healthy lifestyle includes regular exercise, eating a well-balanced diet, keeping a healthy weight and not smoking. Regular physical exams and screening tests are another important way to take care of yourself. Preventive exams provided by health care providers can find health problems early when treatment works best and can keep you from getting certain diseases or illnesses. Preventive services include exams, lab tests, screenings, shots, monitoring and information to help you take care of your own health. All people over 65 should have a pneumonia shot. Pneumonia shots are usually only needed once in a lifetime unless your doctor decides differently. All people over 65 should have a yearly flu shot. People over 65 are at medium to high risk for Hepatitis B. Three shots are needed for complete protection. In addition to your physical exam, some screening tests are recommended: 
 
Bone mass measurement (dexa scan) is recommended every two years Diabetes Mellitus screening is recommended every year. Glaucoma is an eye disease caused by high pressure in the eye. An eye exam is recommended every year. Cardiovascular screening tests that check your cholesterol and other blood fat (lipid) levels are recommended every five years. Colorectal Cancer screening tests help to find pre-cancerous polyps (growths in the colon) so they can be removed before they turn into cancer. Tests ordered for screening depend on your personal and family history risk factors. Screening for Breast Cancer is recommended yearly with a mammogram. 
 
Screening for Cervical Cancer is recommended every two years (annually for certain risk factors, such as previous history of STD or abnormal PAP in past 7 years), with a Pelvic Exam with PAP Here is a list of your current Health Maintenance items with a due date: 
Health Maintenance Topic Date Due  Shingrix Vaccine Age 50> (1 of 2) HOLD UNTIL AFTER TREATMENT FOR ORAL CANCER  Bone Densitometry (Dexa) Screening  HOLD UNTIL AFTER TREATMENT FOR ORAL CANCER  Pneumococcal 65+ Low/Medium Risk (1 of 2 - PCV13) DONE TODAY  
 HEMOGLOBIN A1C Q6M  12/28/2018  MICROALBUMIN Q1  03/28/2019  LIPID PANEL Q1  03/28/2019  
 FOOT EXAM Q1  04/25/2019  
 EYE EXAM RETINAL OR DILATED Q1  05/10/2019  BREAST CANCER SCRN MAMMOGRAM  06/01/2019  MEDICARE YEARLY EXAM  11/21/2019  GLAUCOMA SCREENING Q2Y  05/10/2020  
 DTaP/Tdap/Td series (2 - Td) 06/01/2021  COLONOSCOPY  06/04/2023  Hepatitis C Screening  Completed  Influenza Age 5 to Adult  Completed Zolpidem (By mouth) Zolpidem Tartrate (zole-PI-dem TAR-trate) Treats insomnia. Brand Name(s): Ambien, Ambien CR There may be other brand names for this medicine. When This Medicine Should Not Be Used: This medicine is not right for everyone. Do not use it if you had an allergic reaction to zolpidem. How to Use This Medicine:  
Tablet, Long Acting Tablet · Take your medicine as directed. Your dose may need to be changed several times to find what works best for you. · This medicine is not for long-term use. · This medicine is usually taken just before bedtime, or when you are having trouble falling asleep. Do not take this medicine if you are not able to sleep or rest for 7 to 8 hours before you need to be active again. · Do not take this medicine with food or right after a meal because it may not work as well. · Do not take this medicine if you have drank alcohol the same evening. · Swallow the extended-release tablet whole. Do not crush, break, or chew it. · This medicine should come with a Medication Guide. Ask your pharmacist for a copy if you do not have one. · Use this medicine only when you cannot sleep. You do not need to take it on a schedule. · Store the medicine in a closed container at room temperature, away from heat, moisture, and direct light. Drugs and Foods to Avoid: Ask your doctor or pharmacist before using any other medicine, including over-the-counter medicines, vitamins, and herbal products. · Some medicines can affect how zolpidem works. Tell your doctor if you are using chlorpromazine, fluoxetine, imipramine, ketoconazole, rifampin, sertraline, or Jaren's wort. · Tell your doctor if you use anything else that makes you sleepy. Some examples are allergy medicine, narcotic pain medicine, and alcohol. · Do not drink alcohol while you are using this medicine. Warnings While Using This Medicine: · Tell your doctor if you are pregnant or breastfeeding, or if you have kidney disease, liver disease, lung disease or breathing problems (such as sleep apnea), or myasthenia gravis. Tell your doctor if you have a history of alcohol or drug addiction, depression, or mental health problems. · This medicine may make you dizzy or drowsy, especially first thing the next morning. Do not drive or do anything that could be dangerous until you know how this medicine affects you. · This medicine may cause unusual moods and behaviors. You may also do things while you are still asleep that you may not remember the next morning, such as driving. Tell your doctor right away if you learn that this has happened. Also tell your doctor if you have any thought or behavior changes (such as problems with gambling or increased sex drive) that concern you. · This medicine can be habit-forming. Do not use more than your prescribed dose. Call your doctor if you think your medicine is not working. · Call your doctor if you still have trouble sleeping after you take this medicine for 7 to 10 days. · Do not stop using this medicine suddenly. Your doctor will need to slowly decrease your dose before you stop it completely. · Keep all medicine out of the reach of children. Never share your medicine with anyone. Possible Side Effects While Using This Medicine:  
Call your doctor right away if you notice any of these side effects: · Allergic reaction: Itching or hives, swelling in your face or hands, swelling or tingling in your mouth or throat, chest tightness, trouble breathing · Anxiety, depression, nervousness, unusual behavior, or thoughts of hurting yourself · Memory loss · Seeing, hearing, or feeling things that are not there · Severe confusion, drowsiness, muscle weakness · Trouble breathing If you notice these less serious side effects, talk with your doctor: · Daytime drowsiness If you notice other side effects that you think are caused by this medicine, tell your doctor. Call your doctor for medical advice about side effects. You may report side effects to FDA at 5-253-FDA-6818 © 2017 [facility] Information is for End User's use only and may not be sold, redistributed or otherwise used for commercial purposes. The above information is an  only. It is not intended as medical advice for individual conditions or treatments. Talk to your doctor, nurse or pharmacist before following any medical regimen to see if it is safe and effective for you.

## 2018-11-23 ENCOUNTER — LAB ONLY (OUTPATIENT)
Dept: FAMILY MEDICINE CLINIC | Age: 63
End: 2018-11-23

## 2018-11-23 DIAGNOSIS — Z01.89 ROUTINE LAB DRAW: Primary | ICD-10-CM

## 2018-11-23 LAB
A-G RATIO,AGRAT: 1.4 RATIO (ref 1.1–2.6)
ALBUMIN SERPL-MCNC: 3.9 G/DL (ref 3.5–5)
ALP SERPL-CCNC: 82 U/L (ref 40–120)
ALT SERPL-CCNC: 17 U/L (ref 5–40)
ANION GAP SERPL CALC-SCNC: 15 MMOL/L
AST SERPL W P-5'-P-CCNC: 24 U/L (ref 10–37)
BILIRUB SERPL-MCNC: 0.9 MG/DL (ref 0.2–1.2)
BUN SERPL-MCNC: 9 MG/DL (ref 6–22)
CALCIUM SERPL-MCNC: 9.1 MG/DL (ref 8.4–10.5)
CHLORIDE SERPL-SCNC: 99 MMOL/L (ref 98–110)
CO2 SERPL-SCNC: 25 MMOL/L (ref 20–32)
CREAT SERPL-MCNC: 0.5 MG/DL (ref 0.8–1.4)
GFRAA, 66117: >60
GFRNA, 66118: >60
GLOBULIN,GLOB: 2.7 G/DL (ref 2–4)
GLUCOSE SERPL-MCNC: 91 MG/DL (ref 70–99)
POTASSIUM SERPL-SCNC: 4.2 MMOL/L (ref 3.5–5.5)
PROT SERPL-MCNC: 6.6 G/DL (ref 6.2–8.1)
SODIUM SERPL-SCNC: 139 MMOL/L (ref 133–145)

## 2018-11-26 ENCOUNTER — TELEPHONE (OUTPATIENT)
Dept: FAMILY MEDICINE CLINIC | Age: 63
End: 2018-11-26

## 2018-11-26 DIAGNOSIS — G47.00 INSOMNIA, UNSPECIFIED TYPE: Primary | ICD-10-CM

## 2018-11-26 LAB
AVG GLU, 10930: 70 MG/DL (ref 91–123)
CHOLEST SERPL-MCNC: 157 MG/DL (ref 110–200)
HBA1C MFR BLD HPLC: 4.1 % (ref 4.8–5.9)
HDLC SERPL-MCNC: 1.7 MG/DL (ref 0–5)
HDLC SERPL-MCNC: 94 MG/DL (ref 40–59)
LDLC SERPL CALC-MCNC: 49 MG/DL (ref 50–99)
TRIGL SERPL-MCNC: 67 MG/DL (ref 40–149)
VLDLC SERPL CALC-MCNC: 13 MG/DL (ref 8–30)

## 2018-11-26 RX ORDER — RAMELTEON 8 MG/1
8 TABLET ORAL
Qty: 30 TAB | Refills: 2 | Status: SHIPPED | OUTPATIENT
Start: 2018-11-26 | End: 2018-12-11

## 2018-11-26 NOTE — TELEPHONE ENCOUNTER
Called [facility] and get list of formulary alternatives preferred over zolpidem tartrate 5 mg for insomnia. Rozerem and trazodone are formulary alternatives. Trazodone unable to given given prolonged QT. Rozerem denied because insurance stated needed to try alternatives back 9/2018. Representative [place] representative [employer] states this is covered without need for prior authorization. LPN please call patient and inform sleep aid changed to rozerem 8 mg nightly 1.5 hours before bed given pharmacy coverage. Adeline Castillo M.D.   [address], [address] [place] [address] [phone]  J -   [phone]

## 2018-11-26 NOTE — TELEPHONE ENCOUNTER
[facility] called and informed clinic that they received order for CMP but not for lipid panel and A1c ordered at same time as CMP. Orders to be faxed by LPN. Routed to clinic manager for evaluation and discussion on how to prevent this from occurring in future. Junaid Rincon M.D.   [address]   [facility] -   [phone]

## 2018-11-28 ENCOUNTER — TELEPHONE (OUTPATIENT)
Dept: FAMILY MEDICINE CLINIC | Age: 63
End: 2018-11-28

## 2018-11-28 NOTE — TELEPHONE ENCOUNTER
Informed patient of change in sleep aid. Informed patient that medication was changed to rozerem 8 mg nightly 1.5 hours before bed given pharmacy coverage.  Patient stated she did not want the medication that she changed her mind.

## 2018-12-07 ENCOUNTER — TELEPHONE (OUTPATIENT)
Dept: FAMILY MEDICINE CLINIC | Age: 63
End: 2018-12-07

## 2018-12-11 ENCOUNTER — OFFICE VISIT (OUTPATIENT)
Dept: FAMILY MEDICINE CLINIC | Age: 63
End: 2018-12-11

## 2018-12-11 VITALS
TEMPERATURE: 97.7 F | BODY MASS INDEX: 25.87 KG/M2 | HEIGHT: 63 IN | RESPIRATION RATE: 18 BRPM | WEIGHT: 146 LBS | OXYGEN SATURATION: 96 % | DIASTOLIC BLOOD PRESSURE: 54 MMHG | SYSTOLIC BLOOD PRESSURE: 127 MMHG | HEART RATE: 80 BPM

## 2018-12-11 DIAGNOSIS — G47.00 INSOMNIA, UNSPECIFIED TYPE: Primary | ICD-10-CM

## 2018-12-11 DIAGNOSIS — C06.0 SQUAMOUS CELL CARCINOMA OF ORAL MUCOSA (HCC): ICD-10-CM

## 2018-12-11 RX ORDER — ZOLPIDEM TARTRATE 5 MG/1
5 TABLET ORAL
Qty: 30 TAB | Refills: 2 | Status: SHIPPED | OUTPATIENT
Start: 2018-12-11 | End: 2019-03-11

## 2018-12-11 NOTE — PATIENT INSTRUCTIONS
Take ambien as needed for sleep Use [employer] prescription discount card for [place], get from [employer], [employer] or [employer] Insomnia: Care Instructions Your Care Instructions Insomnia is the inability to sleep well. It is a common problem for most people at some time. Insomnia may make it hard for you to get to sleep, stay asleep, or sleep as long as you need to. This can make you tired and grouchy during the day. It can also make you forgetful, less effective at work, and unhappy. Insomnia can be caused by conditions such as depression or anxiety. Pain can also affect your ability to sleep. When these problems are solved, the insomnia usually clears up. But sometimes bad sleep habits can cause insomnia. If insomnia is affecting your work or your enjoyment of life, you can take steps to improve your sleep. Follow-up care is a key part of your treatment and safety. Be sure to make and go to all appointments, and call your doctor if you are having problems. It's also a good idea to know your test results and keep a list of the medicines you take. How can you care for yourself at home? What to avoid · Do not have drinks with caffeine, such as coffee or black tea, for 8 hours before bed. · Do not smoke or use other types of tobacco near bedtime. Nicotine is a stimulant and can keep you awake. · Avoid drinking alcohol late in the evening, because it can cause you to wake in the middle of the night. · Do not eat a big meal close to bedtime. If you are hungry, eat a light snack. · Do not drink a lot of water close to bedtime, because the need to urinate may wake you up during the night. · Do not read or watch TV in bed. Use the bed only for sleeping and sexual activity. What to try · Go to bed at the same time every night, and wake up at the same time every morning. Do not take naps during the day. · Keep your bedroom quiet, dark, and cool. · Sleep on a comfortable pillow and mattress. · If watching the clock makes you anxious, turn it facing away from you so you cannot see the time. · If you worry when you lie down, start a worry book. Well before bedtime, write down your worries, and then set the book and your concerns aside. · Try meditation or other relaxation techniques before you go to bed. · If you cannot fall asleep, get up and go to another room until you feel sleepy. Do something relaxing. Repeat your bedtime routine before you go to bed again. · Make your house quiet and calm about an hour before bedtime. Turn down the lights, turn off the TV, log off the computer, and turn down the volume on music. This can help you relax after a busy day. When should you call for help? Watch closely for changes in your health, and be sure to contact your doctor if: 
  · Your efforts to improve your sleep do not work.  
  · Your insomnia gets worse.  
  · You have been feeling down, depressed, or hopeless or have lost interest in things that you usually enjoy. Where can you learn more? Go to http://afshan-dick.info/. Enter P513 in the search box to learn more about \"Insomnia: Care Instructions. \" Current as of: June 29, 2018 Content Version: 11.8 © 7224-8666 [employer]. Care instructions adapted under license by [employer] (which disclaims liability or warranty for this information). If you have questions about a medical condition or this instruction, always ask your healthcare professional. [place] [address] any warranty or liability for your use of this information.

## 2018-12-11 NOTE — PROGRESS NOTES
Internal Medicine Follow Up Visit Note Chief Complaint Patient presents with  Insomnia  
  she states that she is sleeping better but her insurance does not cover the Ambien, she also states that she does not want to take any medication right now for sleep. HPI:  Charline Ardon is a 70 y.o.  female with history significant for PAD, RA, HTN, DM diet controlled is here for the above complaint(s). Insomnia: since last visit the same. Ambien 5 mg nightly, helpful somewhat. [place] is covered but co-pay 300$. Squamous Cell Carcinoma: s/p PEG tube. Had teeth removed in prep for treatment. Radiation 5x per week and chemotherapy once a week. Current Outpatient Medications Medication Sig  
 zolpidem (AMBIEN) 5 mg tablet Take 1 Tab by mouth nightly as needed for Sleep for up to 90 days. Max Daily Amount: 5 mg.  ondansetron hcl (ZOFRAN) 4 mg tablet TAKE 1 TABLET BY MOUTH EVERY 4 HOURS AS NEEDED FOR NAUSEA AND VOMITING.  ALPRAZolam (XANAX) 0.25 mg tablet TAKE 1 TABLET BY MOUTH 2 TO 3 TIMES PER DAY AS NEEDED FOR ANXIETY.  zolpidem (AMBIEN) 5 mg tablet Take 1 Tab by mouth nightly as needed for Sleep for up to 30 days. Max Daily Amount: 5 mg.  atorvastatin (LIPITOR) 80 mg tablet Take 80 mg by mouth daily.  verapamil ER (CALAN-SR) 180 mg CR tablet Take 1 Tab by mouth daily.  amLODIPine (NORVASC) 5 mg tablet Take 1 Tab by mouth daily.  lisinopril (PRINIVIL, ZESTRIL) 20 mg tablet Take 1 Tab by mouth daily.  hydrALAZINE (APRESOLINE) 100 mg tablet TAKE 1 TABLET BY MOUTH THREE TIMES DAILY  zinc 50 mg tab tablet Take  by mouth.  aspirin (ASPIRIN) 325 mg tablet Take 325 mg by mouth daily.  folic acid (FOLVITE) 1 mg tablet TAKE 1 TABLET BY MOUTH ONCE DAILY  multivitamin (ONE A DAY) tablet Take 1 Tab by mouth daily.  calcium carbonate-vitamin D3 600 mg (1,500 mg)-800 unit chew Take  by mouth. No current facility-administered medications for this visit. Health Maintenance Topic Date Due  Shingrix Vaccine Age 50> (1 of 2) 10/19/2003  Bone Densitometry (Dexa) Screening  10/19/2018  Pneumococcal 65+ High/Highest Risk (2 of 2 - PPSV23) 01/15/2019  MICROALBUMIN Q1  03/28/2019  
 FOOT EXAM Q1  04/25/2019  
 HEMOGLOBIN A1C Q6M  05/23/2019  BREAST CANCER SCRN MAMMOGRAM  06/01/2019  MEDICARE YEARLY EXAM  11/21/2019  LIPID PANEL Q1  11/23/2019  GLAUCOMA SCREENING Q2Y  05/10/2020  
 EYE EXAM RETINAL OR DILATED  05/10/2020  
 DTaP/Tdap/Td series (2 - Td) 06/01/2021  COLONOSCOPY  06/04/2023  Hepatitis C Screening  Completed  Influenza Age 5 to Adult  Completed Immunization History Administered Date(s) Administered  Influenza Vaccine 10/01/2017, 08/30/2018  Pneumococcal Conjugate (PCV-13) 11/20/2018  Pneumococcal Polysaccharide (PPSV-23) 11/18/2010  Tdap 06/01/2011 Allergies and Medications: Reviewed and updated in EMR. Patient Active Problem List  
Diagnosis Code  Carotid artery stenosis I65.29  
 Diabetes type 2, controlled ([facility]) E11.9  
 HLD (hyperlipidemia) E78.5  Hypertension I10  
 PAD (peripheral artery disease) ([facility]) I73.9  Rheumatoid arthritis ([facility] [address] M06.9  S/P insertion of iliac artery stent Z95.828  
 Cataract H26.9  Diabetic polyneuropathy associated with type 2 diabetes mellitus ([facility]) E11.42  
 Diverticulosis of large intestine without hemorrhage K57.30  Mild pulmonary hypertension ([facility]) I27.20  Squamous cell carcinoma of oral mucosa ([facility]) C06.0  Pancytopenia ([facility] [address] X93.793 Family History, Social History, Past Medical History, Surgical History: Reviewed and updated in EMR as appropriate. OBJECTIVE:  
Visit Vitals /54 Pulse 80 Temp 97.7 °F (36.5 °C) (Oral) Resp 18 Ht 5' 3\" (1.6 m) Wt 146 lb (66.2 kg) SpO2 96% BMI 25.86 kg/m² BP Readings from Last 3 Encounters:  
12/11/18 127/54  
11/29/18 131/54  
11/20/18 121/53 Wt Readings from Last 3 Encounters:  
12/11/18 146 lb (66.2 kg)  
11/29/18 144 lb 6.4 oz (65.5 kg)  
11/20/18 145 lb (65.8 kg) General: Pleasant adult woman in no acute distress HEENT: Head is atraumatic normo-cephalic. Neuro: Alert and oriented x3. Gait wnl 
MSE:  Mood euthymic, affect congruent and reactive. No SI/HI. Nursing Notes Reviewed. LABS/RADIOLOGICAL TESTS: 
 
Lab Results Component Value Date/Time WBC 3.7 (L) 11/28/2018 12:48 PM  
 HGB 11.0 (L) 11/28/2018 12:48 PM  
 HCT 32.7 (L) 11/28/2018 12:48 PM  
 PLATELET 995 03/71/7644 12:48 PM  
 
Lab Results Component Value Date/Time Sodium 139 11/23/2018 10:58 AM  
 Potassium 4.2 11/23/2018 10:58 AM  
 Chloride 99 11/23/2018 10:58 AM  
 CO2 25 11/23/2018 10:58 AM  
 Glucose 91 11/23/2018 10:58 AM  
 BUN 9 11/23/2018 10:58 AM  
 Creatinine 0.5 (L) 11/23/2018 10:58 AM  
 
Lab Results Component Value Date/Time Cholesterol, total 157 11/23/2018 10:58 AM  
 HDL Cholesterol 94 (H) 11/23/2018 10:58 AM  
 LDL, calculated 49 (L) 11/23/2018 10:58 AM  
 Triglyceride 67 11/23/2018 10:58 AM  
 
 
All lab results and radiological studies were reviewed and discussed with the patient. ASSESSMENT/PLAN:   
  ICD-10-CM ICD-9-CM 1. Insomnia, unspecified type G47.00 780.52 zolpidem (AMBIEN) 5 mg tablet good rx discount card given <10 dollars Sleep hygiene discussed 2. Squamous cell carcinoma of oral mucosa (HCC) C06.0 145.9 Continue current mgmt F/u with specialist  
 
 
Requested Prescriptions Signed Prescriptions Disp Refills  zolpidem (AMBIEN) 5 mg tablet 30 Tab 2 Sig: Take 1 Tab by mouth nightly as needed for Sleep for up to 90 days. Max Daily Amount: 5 mg. Patient verbalized understanding and agreement with the plan. Patient was given an after-visit summary. Follow-up Disposition: 
Return in about 5 months (around 5/11/2019), or if symptoms worsen or fail to improve, for HCM. or sooner if worsening symptoms. More than 50% of this 15 min visit was spent counseling the patient face to face about etiology and treatment of health conditions outlined in assessment and plan. Tereasa Duverney M.D. [address], [place] [id number] [place], [address] [place], [place] [address] [phone] Fax - [phone] or [phone]

## 2018-12-11 NOTE — PROGRESS NOTES
Chief Complaint Patient presents with  Insomnia  
  she states that she is sleeping better but her insurance does not cover the Ambien, she also states that she does not want to take any medication right now for sleep. ..1. Have you been to the ER, urgent care clinic since your last visit? Hospitalized since your last visit? Yes Reason for visit: cancer 2. Have you seen or consulted any other health care providers outside of the [address] [place] [address] since your last visit? Include any pap smears or colon screening. Yes Where: Oncology

## 2018-12-18 ENCOUNTER — TELEPHONE (OUTPATIENT)
Dept: FAMILY MEDICINE CLINIC | Age: 63
End: 2018-12-18

## 2018-12-18 RX ORDER — VERAPAMIL HYDROCHLORIDE 180 MG/1
180 TABLET, EXTENDED RELEASE ORAL
Qty: 90 TAB | Refills: 1 | Status: SHIPPED | OUTPATIENT
Start: 2018-12-18 | End: 2019-05-09

## 2018-12-18 NOTE — TELEPHONE ENCOUNTER
Received voice mail from pharmacy stating that request for Rx verapamil er is on back order, but they do have the capsules.  Please contact the pharmacy to change request to capsule or submit a new request.

## 2018-12-18 NOTE — PROGRESS NOTES
Verapamil tablets on backorder, change to capsules. Adeline Castillo M.D.   [address], [address] [place] [address], [address] [place] [address] [phone]  [place] [id number] [phone]  [phone]

## 2019-01-08 DIAGNOSIS — I10 ESSENTIAL HYPERTENSION: ICD-10-CM

## 2019-01-08 NOTE — TELEPHONE ENCOUNTER
Per fax from pharmacy requesting a 90 day supply.  Qty 270 tabs  Requested Prescriptions     Pending Prescriptions Disp Refills    hydrALAZINE (APRESOLINE) 100 mg tablet 180 Tab 3

## 2019-01-09 RX ORDER — HYDRALAZINE HYDROCHLORIDE 100 MG/1
TABLET, FILM COATED ORAL
Qty: 180 TAB | Refills: 3 | Status: SHIPPED | OUTPATIENT
Start: 2019-01-09 | End: 2019-04-10

## 2019-02-05 ENCOUNTER — TELEPHONE (OUTPATIENT)
Dept: FAMILY MEDICINE CLINIC | Age: 64
End: 2019-02-05

## 2019-02-05 NOTE — TELEPHONE ENCOUNTER
Received fax transmission from [facility] Encounter Note. Sent to be signed, sent, and scanned.

## 2019-03-08 ENCOUNTER — TELEPHONE (OUTPATIENT)
Dept: FAMILY MEDICINE CLINIC | Age: 64
End: 2019-03-08

## 2019-03-08 NOTE — TELEPHONE ENCOUNTER
Received fax transmission from [phone] [place] [address] Oncology Note. Sent to be signed, sent, and scanned.

## 2019-04-02 ENCOUNTER — TELEPHONE (OUTPATIENT)
Dept: FAMILY MEDICINE CLINIC | Age: 64
End: 2019-04-02

## 2019-04-04 ENCOUNTER — HOSPITAL ENCOUNTER (OUTPATIENT)
Dept: LAB | Age: 64
Discharge: HOME | End: 2019-04-04
Payer: MEDICARE

## 2019-04-04 ENCOUNTER — OFFICE VISIT (OUTPATIENT)
Dept: FAMILY MEDICINE CLINIC | Age: 64
End: 2019-04-04

## 2019-04-04 VITALS
HEIGHT: 63 IN | SYSTOLIC BLOOD PRESSURE: 109 MMHG | TEMPERATURE: 96.9 F | RESPIRATION RATE: 18 BRPM | OXYGEN SATURATION: 97 % | BODY MASS INDEX: 22.96 KG/M2 | DIASTOLIC BLOOD PRESSURE: 53 MMHG | HEART RATE: 84 BPM | WEIGHT: 129.6 LBS

## 2019-04-04 DIAGNOSIS — Z95.828 S/P INSERTION OF ILIAC ARTERY STENT: ICD-10-CM

## 2019-04-04 DIAGNOSIS — Z13.820 OSTEOPOROSIS SCREENING: ICD-10-CM

## 2019-04-04 DIAGNOSIS — H93.8X3 OTOTOXICITY OF BOTH EARS: ICD-10-CM

## 2019-04-04 DIAGNOSIS — I73.9 PAD (PERIPHERAL ARTERY DISEASE) (HCC): ICD-10-CM

## 2019-04-04 DIAGNOSIS — Z78.0 MENOPAUSE: ICD-10-CM

## 2019-04-04 DIAGNOSIS — M06.9 RHEUMATOID ARTHRITIS OF WRIST, UNSPECIFIED LATERALITY, UNSPECIFIED RHEUMATOID FACTOR PRESENCE: ICD-10-CM

## 2019-04-04 DIAGNOSIS — Z12.39 BREAST CANCER SCREENING: ICD-10-CM

## 2019-04-04 DIAGNOSIS — E11.9 CONTROLLED TYPE 2 DIABETES MELLITUS WITHOUT COMPLICATION, WITHOUT LONG-TERM CURRENT USE OF INSULIN (HCC): ICD-10-CM

## 2019-04-04 DIAGNOSIS — E44.1 MILD PROTEIN-CALORIE MALNUTRITION (HCC): ICD-10-CM

## 2019-04-04 DIAGNOSIS — C06.0 SQUAMOUS CELL CARCINOMA OF ORAL MUCOSA (HCC): ICD-10-CM

## 2019-04-04 DIAGNOSIS — I10 ESSENTIAL HYPERTENSION: ICD-10-CM

## 2019-04-04 DIAGNOSIS — E11.42 DIABETIC POLYNEUROPATHY ASSOCIATED WITH TYPE 2 DIABETES MELLITUS (HCC): ICD-10-CM

## 2019-04-04 DIAGNOSIS — I27.20 MILD PULMONARY HYPERTENSION (HCC): ICD-10-CM

## 2019-04-04 DIAGNOSIS — E78.2 MIXED HYPERLIPIDEMIA: ICD-10-CM

## 2019-04-04 DIAGNOSIS — F41.9 ANXIETY: ICD-10-CM

## 2019-04-04 DIAGNOSIS — E11.9 CONTROLLED TYPE 2 DIABETES MELLITUS WITHOUT COMPLICATION, WITHOUT LONG-TERM CURRENT USE OF INSULIN (HCC): Primary | ICD-10-CM

## 2019-04-04 DIAGNOSIS — F33.1 MDD (MAJOR DEPRESSIVE DISORDER), RECURRENT EPISODE, MODERATE (HCC): ICD-10-CM

## 2019-04-04 PROBLEM — D61.818 PANCYTOPENIA (HCC): Status: RESOLVED | Noted: 2018-10-22 | Resolved: 2019-04-04

## 2019-04-04 PROCEDURE — 82043 UR ALBUMIN QUANTITATIVE: CPT

## 2019-04-04 RX ORDER — HYDROXYCHLOROQUINE SULFATE 200 MG/1
200 TABLET, FILM COATED ORAL 2 TIMES DAILY
COMMUNITY

## 2019-04-04 RX ORDER — CLOTRIMAZOLE 10 MG/1
LOZENGE ORAL; TOPICAL
COMMUNITY
Start: 2019-03-27 | End: 2019-08-02 | Stop reason: SDUPTHER

## 2019-04-04 RX ORDER — DULOXETIN HYDROCHLORIDE 20 MG/1
20 CAPSULE, DELAYED RELEASE ORAL DAILY
Qty: 30 CAP | Refills: 2 | Status: SHIPPED | OUTPATIENT
Start: 2019-04-04 | End: 2019-05-01 | Stop reason: SDUPTHER

## 2019-04-04 NOTE — PATIENT INSTRUCTIONS
FOR ANXIETY AND DEPRESSION:  Start cymbalta 20 mg daily  Talk to family and friends about stressors  If you decide you want a talk therapy referral, call clinic to discuss    FOR BLOOD PRESSURE:   START HYDRALAZINE 50 MG TWICE A DAY, CUT PILLS IN HALF  Check blood pressure mid-day 2-3  x per week and write down  Make sure you are seated for at least  5-10 minutes, legs uncrossed and back resting  Bring log to clinic next visit  Max salt per day 2 grams (2000 mg)  IF BLOOD PRESSURE <100/<50 please call MD if pulse >110 or <60 please call MD  IF BLOOD PRESSURE >130/>80 please call MD    We are sending an order to [facility] for mammogram and bone density testing. You should be called about this in 1-2 weeks. If no word in 2 weeks please give us a call to follow up    Duloxetine (By mouth)   Duloxetine (doo-LOX-e-teen)  Treats depression, anxiety, diabetic peripheral neuropathy, fibromyalgia, and chronic muscle or bone pain. This medicine is an SSNRI. Brand Name(s): Cymbalta, DermacinRx DPN Shakeel, Irenka   There may be other brand names for this medicine. When This Medicine Should Not Be Used: This medicine is not right for everyone. Do not use it if you had an allergic reaction to duloxetine. How to Use This Medicine:   Capsule, Delayed Release Capsule  · Take your medicine as directed. Your dose may need to be changed several times to find what works best for you. · Delayed-release capsule: Swallow the capsule whole. Do not crush, chew, break, or open it. · This medicine should come with a Medication Guide. Ask your pharmacist for a copy if you do not have one. · Missed dose: Take a dose as soon as you remember. If it is almost time for your next dose, wait until then and take a regular dose. Do not take extra medicine to make up for a missed dose. · Store the medicine in a closed container at room temperature, away from heat, moisture, and direct light.   Drugs and Foods to Avoid:   Ask your doctor or pharmacist before using any other medicine, including over-the-counter medicines, vitamins, and herbal products. · Do not take duloxetine if you have used an MAO inhibitor (MAOI) within the past 14 days. Do not start taking an MAO inhibitor within 5 days of stopping duloxetine. · Some medicines can affect how duloxetine works. Tell your doctor if you are using any of the following:  ¨ Buspirone, cimetidine, ciprofloxacin, enoxacin, fentanyl, lithium, Jaren's wort, theophylline, tramadol, tryptophan, or warfarin  ¨ Amphetamines  ¨ Blood pressure medicine  ¨ Diuretic (water pill)  ¨ Medicine for heart rhythm problems (including flecainide, propafenone, quinidine)  ¨ Medicine to treat migraine headaches (including triptans)  ¨ NSAID pain or arthritis medicine (including aspirin, celecoxib, diclofenac, ibuprofen, naproxen)  ¨ Other medicine to treat depression or mood disorders (including amitriptyline, desipramine, fluoxetine, imipramine, nortriptyline, paroxetine)  ¨ Phenothiazine medicine (including thioridazine)  · Tell your doctor if you use anything else that makes you sleepy. Some examples are allergy medicine, narcotic pain medicine, and alcohol. · Do not drink alcohol while you are using this medicine. Warnings While Using This Medicine:   · Tell your doctor if you are pregnant or breastfeeding, or if you have kidney disease, liver disease, diabetes, digestion problems, glaucoma, heart disease, high or low blood pressure, or problems with urination. Tell your doctor if you smoke or you have a history of seizures, or drug or alcohol addiction. · This medicine may cause the following problems:   ¨ Serious liver problems  ¨ Serotonin syndrome (more likely when used with certain other medicines)  ¨ Increased risk of bleeding problems  ¨ Serious skin reactions  ¨ Low sodium levels in the blood  · This medicine can increase thoughts of suicide.  Tell your doctor right away if you start to feel depressed and have thoughts about hurting yourself. · This medicine can cause changes in your blood pressure. This may make you dizzy or drowsy. Do not drive or do anything that could be dangerous until you know how this medicine affects you. Stand up slowly to avoid falls. · Do not stop using this medicine suddenly. Your doctor will need to slowly decrease your dose before you stop it completely. · Your doctor will check your progress and the effects of this medicine at regular visits. Keep all appointments. · Keep all medicine out of the reach of children. Never share your medicine with anyone. Possible Side Effects While Using This Medicine:   Call your doctor right away if you notice any of these side effects:  · Allergic reaction: Itching or hives, swelling in your face or hands, swelling or tingling in your mouth or throat, chest tightness, trouble breathing  · Anxiety, restlessness, fever, fast heartbeat, sweating, muscle spasms, diarrhea, seeing or hearing things that are not there  · Blistering, peeling, red skin rash  · Confusion, weakness, muscle twitching  · Dark urine or pale stools, nausea, vomiting, loss of appetite, stomach pain, yellow skin or eyes  · Decrease in how much or how often you urinate  · Eye pain, vision changes, seeing halos around lights  · Feeling more energetic than usual  · Lightheadedness, dizziness, or fainting  · Unusual moods or behaviors, worsening depression, thoughts about hurting yourself, trouble sleeping  · Unusual bleeding or bruising  If you notice these less serious side effects, talk with your doctor:   · Decrease in appetite or weight  · Dry mouth, constipation, mild nausea  · Unusual drowsiness, sleepiness, or tiredness  If you notice other side effects that you think are caused by this medicine, tell your doctor. Call your doctor for medical advice about side effects.  You may report side effects to FDA at 8-691-VBA-6381  © 2017 [facility] Information is for End User's use only and may not be sold, redistributed or otherwise used for commercial purposes. The above information is an  only. It is not intended as medical advice for individual conditions or treatments. Talk to your doctor, nurse or pharmacist before following any medical regimen to see if it is safe and effective for you.

## 2019-04-04 NOTE — PROGRESS NOTES
Nikki Ye is a 70 y.o. female presents in office for    Chief Complaint   Patient presents with    Other     oropharyngeal cancer f/u        Health Maintenance Due   Topic Date Due    Shingrix Vaccine Age 49> (1 of 2) 10/19/2003    Bone Densitometry (Dexa) Screening  10/19/2018    MICROALBUMIN Q1  03/28/2019    FOOT EXAM Q1  04/25/2019    BREAST CANCER SCRN MAMMOGRAM  06/01/2019       1. Have you been to the ER, urgent care clinic since your last visit? Hospitalized since your last visit? [facility] 2/5/19 r/t dehydration    2. Have you seen or consulted any other health care providers outside of the [address] [place] [address] since your last visit? Include any pap smears or colon screening.  [facility]     Learning Assessment 4/25/2018   PRIMARY LEARNER Patient   HIGHEST LEVEL OF EDUCATION - PRIMARY LEARNER  SOME COLLEGE   PRIMARY LANGUAGE ENGLISH   LEARNER PREFERENCE PRIMARY LISTENING   ANSWERED BY JUNITO Wu   RELATIONSHIP SELF

## 2019-04-04 NOTE — PROGRESS NOTES
Internal Medicine Follow Up Visit Note    Chief Complaint   Patient presents with    Other     oropharyngeal cancer f/u       HPI:  Catina Eckert is a 70 y.o.  female with history significant for PAD, RA, HTN, DM diet controlled is here for the above complaint(s). Comes to clinic with daughter. 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 overrall feels \"better. \" Continues to have PEG tube, getting feedings through PEG tube. Had teeth pulled, got dentures yesterday for bottom and top teeth, still getting used to it. Horrible dry mouth, drinking water daily. Anxiety and depression: Daughter worried about situation depression. Xanax rare use now. Worse depressed mood lately per daughter. Per patient emotionally feeling \"drained' and occasional sadness, less talking to friends. Sometimes feels depressive sx make it hard to get through the day. Drinking ensures okay without significant issues. Xanax on average once or twice every few weeks. For past 2 weeks reports nearly daily trouble sleeping, low energy, poor appetite, worrying too much about different things. Current Outpatient Medications   Medication Sig    hydroxychloroquine (PLAQUENIL) 200 mg tablet Take 200 mg by mouth daily.  clotrimazole (MYCELEX) 10 mg rosanna     DULoxetine (CYMBALTA) 20 mg capsule Take 1 Cap by mouth daily for 90 days.  oxyCODONE-acetaminophen (PERCOCET) 5-325 mg per tablet Take 1 Tab by mouth two (2) times daily as needed for Pain.  fentaNYL (DURAGESIC) 50 mcg/hr PATCH 0.5 Patches by TransDERmal route every seventy-two (72) hours.  ALPRAZolam (XANAX) 0.5 mg tablet Take 0.5 mg by mouth.  hydrALAZINE (APRESOLINE) 100 mg tablet TAKE 1 TABLET BY MOUTH THREE TIMES DAILY (Patient taking differently: Take 100 mg by mouth two (2) times a day.  TAKE 1 TABLET BY MOUTH THREE TIMES DAILY)    verapamil ER (CALAN-SR) 180 mg CR tablet Take 1 Tab by mouth nightly for 180 days.  atorvastatin (LIPITOR) 80 mg tablet Take 80 mg by mouth daily.  amLODIPine (NORVASC) 5 mg tablet Take 1 Tab by mouth daily.  lisinopril (PRINIVIL, ZESTRIL) 20 mg tablet Take 1 Tab by mouth daily. No current facility-administered medications for this visit. Health Maintenance   Topic Date Due    Shingrix Vaccine Age 49> (1 of 2) 10/19/2003    Bone Densitometry (Dexa) Screening  10/19/2018    FOOT EXAM Q1  04/25/2019    BREAST CANCER SCRN MAMMOGRAM  06/01/2019    HEMOGLOBIN A1C Q6M  05/23/2019    Influenza Age 9 to Adult  08/01/2019    Pneumococcal 65+ years (2 of 2 - PPSV23) 11/20/2019    MEDICARE YEARLY EXAM  11/21/2019    LIPID PANEL Q1  11/23/2019    MICROALBUMIN Q1  04/04/2020    GLAUCOMA SCREENING Q2Y  05/10/2020    EYE EXAM RETINAL OR DILATED  05/10/2020    DTaP/Tdap/Td series (2 - Td) 06/01/2021    COLONOSCOPY  06/04/2023    Hepatitis C Screening  Completed     Immunization History   Administered Date(s) Administered    Influenza Vaccine 10/01/2017, 08/30/2018    Influenza Vaccine (Quad) PF 08/21/2018    Pneumococcal Conjugate (PCV-13) 11/20/2018    Pneumococcal Polysaccharide (PPSV-23) 11/18/2010    Tdap 06/01/2011       Allergies and Medications: Reviewed and updated in EMR.      Patient Active Problem List   Diagnosis Code    Carotid artery stenosis I65.29    Diabetes type 2, controlled ([address] E11.9    HLD (hyperlipidemia) E78.5    Hypertension I10    PAD (peripheral artery disease) (HCC) I73.9    Rheumatoid arthritis ([address] M06.9    S/P insertion of iliac artery stent Z95.828    Cataract H26.9    Diabetic polyneuropathy associated with type 2 diabetes mellitus ([address] E11.42    Diverticulosis of large intestine without hemorrhage K57.30    Mild pulmonary hypertension (HCC) I27.20    Squamous cell carcinoma of oral mucosa (HCC) C06.0    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 OBJECTIVE:   Visit Vitals  /53 (BP 1 Location: Right arm, BP Patient Position: Sitting)   Pulse 84   Temp 96.9 °F (36.1 °C) (Oral)   Resp 18   Ht 5' 3\" (1.6 m)   Wt 129 lb 9.6 oz (58.8 kg)   SpO2 97%   BMI 22.96 kg/m²        BP Readings from Last 3 Encounters:   04/04/19 109/53   03/14/19 112/42   02/05/19 146/73     Wt Readings from Last 3 Encounters:   04/04/19 129 lb 9.6 oz (58.8 kg)   02/05/19 128 lb 1.4 oz (58.1 kg)   02/01/19 125 lb 7.1 oz (56.9 kg)       General: Pleasant adult woman in no acute distress  HEENT: Head is atraumatic normo-cephalic. Neuro: Alert and oriented x3. Mental Status Evaluation:     Appearance Adult woman dressed casually, good GH, good EC   Attitude Cooperative, pleasant   Behavior:  No PMA/R   Speech:  Normal, spontaneous   Mood:  Euthymic with underlyikng dysphoria   Affect:  Congruent and reactive   Thought Process:  Linear, goal directed   Thought Content:  As per HPI   Sensorium:  alert   Cognition:  Grossly intact, not formally assessed   Insight:  fair   Judgment: good                                  Impulse Control:  good   PHQ-9: 14, somewhat difficult  PARAM-7: 7, somewhat difficult      Nursing Notes Reviewed.     LABS/RADIOLOGICAL TESTS:    [facility] ONCOLOGY NOTE 3/1/2019  WBC: 4.5, HB/HCT 8.1/25.1   Lab Results   Component Value Date/Time    WBC 1.7 (L) 02/05/2019 10:10 AM    HGB 6.4 (LL) 02/05/2019 10:10 AM    HCT 18.3 (LL) 02/05/2019 10:10 AM    PLATELET 173 (L) 69/12/8802 10:10 AM     Lab Results   Component Value Date/Time    Sodium 133 (L) 02/05/2019 10:10 AM    Potassium 3.9 02/05/2019 10:10 AM    Chloride 95 (L) 02/05/2019 10:10 AM    CO2 30 02/05/2019 10:10 AM    CO2, TOTAL 28 02/05/2019 10:57 AM    Glucose 112 (H) 02/05/2019 10:10 AM    BUN 16 02/05/2019 10:10 AM    Creatinine 0.9 02/05/2019 10:10 AM     Lab Results Component Value Date/Time    Cholesterol, total 157 11/23/2018 10:58 AM    HDL Cholesterol 94 (H) 11/23/2018 10:58 AM    LDL, calculated 49 (L) 11/23/2018 10:58 AM    Triglyceride 67 11/23/2018 10:58 AM       All lab results and radiological studies were reviewed and discussed with the patient. ASSESSMENT/PLAN:      ICD-10-CM ICD-9-CM    1. Controlled type 2 diabetes mellitus without complication, without long-term current use of insulin ([facility]) E11.9 250.00 MICROALBUMIN, UR, RAND W/ MICROALB/CREAT RATIO   2. 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 388.8    3. PAD (peripheral artery disease) ([facility]) I73.9 443.9    4. Mild pulmonary hypertension ([facility]) I27.20 416.8    5. Mild protein-calorie malnutrition ([facility]) E44.1 263.1    6. Squamous cell carcinoma of oral mucosa ([facility]) C06.0 145.9    7. Diabetic polyneuropathy associated with type 2 diabetes mellitus ([facility]) E11.42 250.60      357.2    8. Rheumatoid arthritis of wrist, unspecified laterality, unspecified rheumatoid factor presence ([facility]) M06.9 714.0    9. Essential hypertension I10 401.9    10. S/P insertion of iliac artery stent Z95.828 V45.89    11. Mixed hyperlipidemia E78.2 272.2    12. Breast cancer screening Z12.31 V76.10 [place] MAMMO BI SCREENING INCL CAD   15. Menopause Z78.0 627.2 DEXA BONE DENSITY STUDY AXIAL   14. Osteoporosis screening Z13.820 V82.81 DEXA BONE DENSITY STUDY AXIAL   15. MDD (major depressive disorder), recurrent episode, moderate ([facility]) F33.1 296.32 DULoxetine (CYMBALTA) 20 mg capsule   16.  Anxiety F41.9 300.00 DULoxetine (CYMBALTA) 20 mg capsule   encouraged to f/u with all specialist including vascular, rheumatology, oncology    FOR ANXIETY AND DEPRESSION:  Start cymbalta 20 mg daily  Talk to family and friends about stressors  If you decide you want a talk therapy referral, call clinic to discuss    FOR BLOOD PRESSURE:   START HYDRALAZINE 50 MG TWICE A DAY, CUT PILLS IN HALF  Check blood pressure mid-day 2-3  x per week and write down  Make sure you are seated for at least  5-10 minutes, legs uncrossed and back resting  Bring log to clinic next visit  Max salt per day 2 grams (2000 mg)  IF BLOOD PRESSURE <100/<50 please call MD if pulse >110 or <60 please call MD  IF BLOOD PRESSURE >130/>80 please call MD      Requested Prescriptions     Signed Prescriptions Disp Refills    DULoxetine (CYMBALTA) 20 mg capsule 30 Cap 2     Sig: Take 1 Cap by mouth daily for 90 days. Patient verbalized understanding and agreement with the plan. Patient was given an after-visit summary. Follow-up and Dispositions    · Return in about 1 month (around 5/2/2019), or if symptoms worsen or fail to improve, for HTN, depression. or sooner if worsening symptoms. More than 50% of this 40 min visit was spent counseling the patient face to face about etiology and treatment of health conditions outlined in assessment and plan. Ana María James M.D.   [place] [id number] [place] [address] [phone]  [phone]

## 2019-04-05 LAB
CREAT UR-MCNC: 68 MG/DL (ref 30–125)
MICROALBUMIN UR-MCNC: 1.04 MG/DL (ref 0–3)
MICROALBUMIN/CREAT UR-RTO: 15 MG/G (ref 0–30)

## 2019-04-09 ENCOUNTER — TELEPHONE (OUTPATIENT)
Dept: FAMILY MEDICINE CLINIC | Age: 64
End: 2019-04-09

## 2019-04-09 NOTE — TELEPHONE ENCOUNTER
Called [facility]. If <100/<50 tomorrow call MD. Will likely d/c norvasc 5 mg daily. Kane Garcia M.D.   [address], [address] [place] [address], [address] [place] [address] [phone]  [facility] -   [phone]

## 2019-04-09 NOTE — TELEPHONE ENCOUNTER
[facility] states saw pt this morning and her BP was 92/62. States was told by pt's daughter Hydralazine was adjusted from 200 mg to 100 mg. States instructed daughter to check pt's BP before giving her the next dose of BP medication. States inquiring what are pt's parameters regarding BP. States please call to discuss at any time.

## 2019-04-10 ENCOUNTER — TELEPHONE (OUTPATIENT)
Dept: FAMILY MEDICINE CLINIC | Age: 64
End: 2019-04-10

## 2019-04-10 NOTE — TELEPHONE ENCOUNTER
Pt's daughter called to report pt's BP is 78/44. Dr Tramaine Bedolla instructed her to take pt to the ER at this time. Daughter stated she understood.

## 2019-05-01 DIAGNOSIS — F41.9 ANXIETY: ICD-10-CM

## 2019-05-01 DIAGNOSIS — F33.1 MDD (MAJOR DEPRESSIVE DISORDER), RECURRENT EPISODE, MODERATE (HCC): ICD-10-CM

## 2019-05-01 RX ORDER — DULOXETIN HYDROCHLORIDE 20 MG/1
20 CAPSULE, DELAYED RELEASE ORAL DAILY
Qty: 30 CAP | Refills: 2 | Status: SHIPPED | OUTPATIENT
Start: 2019-05-01 | End: 2019-05-09

## 2019-05-09 ENCOUNTER — OFFICE VISIT (OUTPATIENT)
Dept: FAMILY MEDICINE CLINIC | Age: 64
End: 2019-05-09

## 2019-05-09 VITALS
SYSTOLIC BLOOD PRESSURE: 112 MMHG | HEIGHT: 63 IN | RESPIRATION RATE: 18 BRPM | WEIGHT: 124 LBS | DIASTOLIC BLOOD PRESSURE: 60 MMHG | OXYGEN SATURATION: 98 % | BODY MASS INDEX: 21.97 KG/M2 | TEMPERATURE: 95.2 F | HEART RATE: 85 BPM

## 2019-05-09 DIAGNOSIS — M06.9 RHEUMATOID ARTHRITIS OF WRIST, UNSPECIFIED LATERALITY, UNSPECIFIED RHEUMATOID FACTOR PRESENCE: ICD-10-CM

## 2019-05-09 DIAGNOSIS — E07.89 OTHER SPECIFIED DISORDERS OF THYROID: ICD-10-CM

## 2019-05-09 DIAGNOSIS — I10 ESSENTIAL HYPERTENSION: Primary | ICD-10-CM

## 2019-05-09 DIAGNOSIS — F43.22 ADJUSTMENT DISORDER WITH ANXIOUS MOOD: ICD-10-CM

## 2019-05-09 DIAGNOSIS — E11.9 CONTROLLED TYPE 2 DIABETES MELLITUS WITHOUT COMPLICATION, WITHOUT LONG-TERM CURRENT USE OF INSULIN (HCC): ICD-10-CM

## 2019-05-09 DIAGNOSIS — R79.89 LOW TSH LEVEL: ICD-10-CM

## 2019-05-09 DIAGNOSIS — E78.5 HYPERLIPIDEMIA, UNSPECIFIED HYPERLIPIDEMIA TYPE: ICD-10-CM

## 2019-05-09 DIAGNOSIS — D64.9 ANEMIA, UNSPECIFIED TYPE: ICD-10-CM

## 2019-05-09 PROBLEM — C06.0: Status: RESOLVED | Noted: 2018-10-22 | Resolved: 2019-05-09

## 2019-05-09 RX ORDER — HYDROXYZINE 25 MG/1
25 TABLET, FILM COATED ORAL
Qty: 45 TAB | Refills: 2 | Status: SHIPPED | OUTPATIENT
Start: 2019-05-09 | End: 2019-08-02

## 2019-05-09 RX ORDER — VERAPAMIL HYDROCHLORIDE 100 MG/1
100 CAPSULE, EXTENDED RELEASE ORAL
Qty: 30 CAP | Refills: 2 | Status: SHIPPED | OUTPATIENT
Start: 2019-05-09 | End: 2019-06-06 | Stop reason: SDUPTHER

## 2019-05-09 RX ORDER — LISINOPRIL 10 MG/1
10 TABLET ORAL DAILY
Qty: 30 TAB | Refills: 2 | Status: SHIPPED | OUTPATIENT
Start: 2019-05-09 | End: 2019-06-06

## 2019-05-09 NOTE — PATIENT INSTRUCTIONS
FOR ANXIETY: 
STOP XANAX 
USE HYDROXYZINE 25 MG TWICE A DAY AS NEEDED FOR ANXIETY 
 
FOR BLOOD PRESSURE: 
STOP VERAPAMIL  AND LISINOPRIL 20 MG DAILY 
START LISINOPRIL 10 MG DAILY IN MORNING AND VERAPAMIL  MG AT NIGHT STARTING TOMORROW NIGHT Check blood pressure mid-day 2-3  x per week and write down Make sure you are seated for at least  5-10 minutes, legs uncrossed and back resting Bring log to clinic next visit Max salt per day 2 grams (2000 mg) BRING LOG TO CLINIC IN 3 WEEKS 
 
GET BLOOD WORK FROM [facility]

## 2019-05-09 NOTE — PROGRESS NOTES
Internal Medicine Follow Up Visit Note Chief Complaint Patient presents with  Hypertension  
  follow up  Depression  
  follow up HPI:  Maco Haines is a 70 y.o.  female with history significant for h/o SCC now in remission as of 5/2019, RA, HTN, PAD is here for the above complaint(s). Anxiety and Depression: since last visit feeling emotionally \"good. \" [place] Jock for 1 month, not helpful and stopped medicine, feels anxiety related to cancer treatment. PET scan completed since last visit, technically in remission. Less anxiety since finding out about cancer status. Willing to try hydroxyzine. Hypertension: Today BP is controlled. Taking lisinopril 20 mg and verpamil  mg daily. . No side effects from medications. Medication good compliance. Denies headache, lightheadedness, dizziness, vision changes, chest pain, rapid/irregular heart rate, shortness of breath, cough, abdominal pain, leg swelling, leg pain. Continues to have episodes of hypotension, lowest was 3 weeks ago 88/50 with lisinopril and verapamil together, last occurred 3 weeks ago. Since then BP averaging 110s/60s. Without medicine for one day /78. Current Outpatient Medications Medication Sig  
 fluticasone propionate (FLONASE NA) by Nasal route.  hydroxychloroquine (PLAQUENIL) 200 mg tablet Take 200 mg by mouth daily.  clotrimazole (MYCELEX) 10 mg rosanna  oxyCODONE-acetaminophen (PERCOCET) 5-325 mg per tablet Take 1 Tab by mouth two (2) times daily as needed for Pain.  ALPRAZolam (XANAX) 0.5 mg tablet Take 0.5 mg by mouth.  atorvastatin (LIPITOR) 80 mg tablet Take 80 mg by mouth daily.  lisinopril (PRINIVIL, ZESTRIL) 20 mg tablet Take 1 Tab by mouth daily. No current facility-administered medications for this visit. Health Maintenance Topic Date Due  Shingrix Vaccine Age 50> (1 of 2) 10/19/2003  HEMOGLOBIN A1C Q6M  05/23/2019  Influenza Age 5 to Adult  08/01/2019  Pneumococcal 65+ years (2 of 2 - PPSV23) 11/20/2019  MEDICARE YEARLY EXAM  11/21/2019  LIPID PANEL Q1  11/23/2019  MICROALBUMIN Q1  04/04/2020  
 FOOT EXAM Q1  05/09/2020  GLAUCOMA SCREENING Q2Y  05/10/2020  
 EYE EXAM RETINAL OR DILATED  05/10/2020  BREAST CANCER SCRN MAMMOGRAM  04/12/2021  
 DTaP/Tdap/Td series (2 - Td) 06/01/2021  COLONOSCOPY  06/04/2023  Hepatitis C Screening  Completed  Bone Densitometry (Dexa) Screening  Completed Immunization History Administered Date(s) Administered  Influenza Vaccine 10/01/2017, 08/30/2018  Influenza Vaccine (Quad) PF 08/21/2018  Pneumococcal Conjugate (PCV-13) 11/20/2018  Pneumococcal Polysaccharide (PPSV-23) 11/18/2010  Tdap 06/01/2011 Allergies and Medications: Reviewed and updated in EMR. Patient Active Problem List  
Diagnosis Code  Carotid artery stenosis I65.29  
 Diabetes type 2, controlled ([facility]) E11.9  
 HLD (hyperlipidemia) E78.5  Hypertension I10  
 PAD (peripheral artery disease) ([facility]) I73.9  Rheumatoid arthritis ([facility] M06.9  S/P insertion of iliac artery stent Z95.828  
 Cataract H26.9  Diabetic polyneuropathy associated with type 2 diabetes mellitus ([facility]) E11.42  
 Diverticulosis of large intestine without hemorrhage K57.30  Mild pulmonary hypertension ([facility]) I27.20  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 Family History, Social History, Past Medical History, Surgical History: Reviewed and updated in EMR as appropriate. OBJECTIVE:  
Visit Vitals /60 Pulse 85 Temp 95.2 °F (35.1 °C) (Oral) Resp 18 Ht 5' 3\" (1.6 m) Wt 124 lb (56.2 kg) SpO2 98% BMI 21.97 kg/m² BP Readings from Last 3 Encounters:  
05/09/19 112/60  
04/04/19 109/53  
03/14/19 112/42 Wt Readings from Last 3 Encounters:  
05/09/19 124 lb (56.2 kg) 04/04/19 129 lb 9.6 oz (58.8 kg) 02/05/19 128 lb 1.4 oz (58.1 kg) General: Pleasant adult woman in no acute distress HEENT: Head is atraumatic normo-cephalic. Neuro: Alert and oriented x3. Gait wnl  
MSE: mood euthymic, affect congruent and reactive. PHQ-9: 2, not difficult at all PARAM-7: 2, not difficult at all Diabetic Foot exam: 1+ dorsalis pedis and posterior tibialis pulses bilaterally. Positive monofilament in all 10 toes and bottoms of both feet decreased on left foot, chronic per patient. Vibratory sense intact bilateral MTP joints. Joint poisition sense intact bilateral first digits. Skin negative for ulcerative/plaque lesions, signs of infection, or other visual foot abnormalities. Positive onychomycosis. Nursing Notes Reviewed. LABS/RADIOLOGICAL TESTS: 
 
Lab Results Component Value Date/Time WBC 1.7 (L) 02/05/2019 10:10 AM  
 HGB 6.4 (LL) 02/05/2019 10:10 AM  
 HCT 18.3 (LL) 02/05/2019 10:10 AM  
 PLATELET 091 (L) 87/05/5200 10:10 AM  
 
Lab Results Component Value Date/Time Sodium 133 (L) 02/05/2019 10:10 AM  
 Potassium 3.9 02/05/2019 10:10 AM  
 Chloride 95 (L) 02/05/2019 10:10 AM  
 CO2 30 02/05/2019 10:10 AM  
 CO2, TOTAL 28 02/05/2019 10:57 AM  
 Glucose 112 (H) 02/05/2019 10:10 AM  
 BUN 16 02/05/2019 10:10 AM  
 Creatinine 0.9 02/05/2019 10:10 AM  
 
Lab Results Component Value Date/Time Cholesterol, total 157 11/23/2018 10:58 AM  
 HDL Cholesterol 94 (H) 11/23/2018 10:58 AM  
 LDL, calculated 49 (L) 11/23/2018 10:58 AM  
 Triglyceride 67 11/23/2018 10:58 AM  
 
No results found for: GPT All lab results and radiological studies were reviewed and discussed with the patient. ASSESSMENT/PLAN:   
  ICD-10-CM ICD-9-CM 1. Essential hypertension I10 401.9 verapamil ER (VERELAN PM) 100 mg capsule  
   lisinopril (PRINIVIL, ZESTRIL) 10 mg tablet Both medicines decreased BP log RTC in 4 weeks 2.  Controlled type 2 diabetes mellitus without complication, without long-term current use of insulin (HCC) E11.9 250.00 HEMOGLOBIN A1C WITH EAG  
   METABOLIC PANEL, COMPREHENSIVE  
   HEMOGLOBIN A1C WITH EAG 3. Rheumatoid arthritis of wrist, unspecified laterality, unspecified rheumatoid factor presence (HCC) M06.9 714.0 Continue current mgmt F/u with Rheum 4. Hyperlipidemia, unspecified hyperlipidemia type E78.5 272.4 Continue current mgmt 5. Adjustment disorder with anxious mood F43.22 309.24 hydrOXYzine HCl (ATARAX) 25 mg tablet D/C xanax 6. Low TSH level R79.89 794.5 TSH 3RD GENERATION  
   T4, FREE  
7. Anemia, unspecified type D64.9 285.9 CBC WITH AUTOMATED DIFF  
8. Other specified disorders of thyroid  E07.89 246.8 TSH 3RD GENERATION  
   T4, FREE Requested Prescriptions Signed Prescriptions Disp Refills  verapamil ER (VERELAN PM) 100 mg capsule 30 Cap 2 Sig: Take 1 Cap by mouth nightly for 90 days.  lisinopril (PRINIVIL, ZESTRIL) 10 mg tablet 30 Tab 2 Sig: Take 1 Tab by mouth daily for 90 days.  hydrOXYzine HCl (ATARAX) 25 mg tablet 45 Tab 2 Sig: Take 1 Tab by mouth two (2) times daily as needed for Anxiety for up to 90 days. Patient verbalized understanding and agreement with the plan. Patient was given an after-visit summary. Follow-up and Dispositions · Return in about 1 month (around 6/6/2019), or if symptoms worsen or fail to improve, for HTN. or sooner if worsening symptoms. More than 50% of this 40 min visit was spent counseling the patient face to face about etiology and treatment of health conditions outlined in assessment and plan. Etta Holliday M.D. [address], [address] [place], [address] [place] [address] - [phone] Fax - [phone] or [phone]

## 2019-05-09 NOTE — PROGRESS NOTES
Chief Complaint Patient presents with  Hypertension  
  follow up  Depression  
  follow up Karthikeyan Alvarenga 1. Have you been to the ER, urgent care clinic since your last visit? Hospitalized since your last visit? Yes Reason for visit: chemo 2. Have you seen or consulted any other health care providers outside of the [address] [place] [address] since your last visit? Include any pap smears or colon screening. Yes Where: Dr. Ayanna Truong

## 2019-05-10 ENCOUNTER — TELEPHONE (OUTPATIENT)
Dept: FAMILY MEDICINE CLINIC | Age: 64
End: 2019-05-10

## 2019-05-16 ENCOUNTER — TELEPHONE (OUTPATIENT)
Dept: FAMILY MEDICINE CLINIC | Age: 64
End: 2019-05-16

## 2019-05-16 DIAGNOSIS — N17.9 AKI (ACUTE KIDNEY INJURY) (HCC): Primary | ICD-10-CM

## 2019-05-16 NOTE — TELEPHONE ENCOUNTER
Drinking 40-50 ounces of water per day. And shakes. Try to increase water to 80 ounces per day, recheck kidneys in 1 week, can go back to [facility]. Fadumo Khan M.D. [address], [address] [place] [address] [phone]  [facility] -   [phone]  .

## 2019-05-17 ENCOUNTER — TELEPHONE (OUTPATIENT)
Dept: FAMILY MEDICINE CLINIC | Age: 64
End: 2019-05-17

## 2019-05-17 NOTE — TELEPHONE ENCOUNTER
Pt's daughter Elise Fermin states pt spoke with Dr Christa Carbone regarding results. States pt did not here Dr Christa Carbone completely. States pt is currently hard of hearing due to side effects of Chemo. Inquiring if pt need to go to cancer center today for dehydration. States please call anytime to discuss.

## 2019-05-20 NOTE — TELEPHONE ENCOUNTER
LPN please call daughter and inform of below:    Labs show dehydration. Try to increase water to 80 ounces per day, recheck kidneys in 1 week, can go back to [facility]. If unable to tolerate PO fluids can go to ED for dehydration related kidney injury and IV fluids. Renay Vickers M.D.   [address], [address] [place] [address]   [facility] -   [phone]

## 2019-05-20 NOTE — TELEPHONE ENCOUNTER
Spoke to patient's daughter. Informed her of providers message. Daughter states that patient was able to get [de-identified] ozs of water down on Friday throughout the day and finished at 9pm.  Patient then vomited up some water. Daughter has patient at Dr [facility] office (Oncology) for IV fluid administration and states that patient's BP in one arm was 66/30 and too low to obtain in the other arm. Daughter states that Dr Friedman Done stopped the lisinopril and verapamil but advised if the SBP is greater that 140 to take the verapamil only. Routed to Dr Christa Carbone for review.

## 2019-05-20 NOTE — TELEPHONE ENCOUNTER
Given report to hospital to crystal Little M.D.   [address], [address] [place] [address], [address] [place] [address]   [facility]   [phone]

## 2019-05-23 ENCOUNTER — TELEPHONE (OUTPATIENT)
Dept: FAMILY MEDICINE CLINIC | Age: 64
End: 2019-05-23

## 2019-05-23 NOTE — TELEPHONE ENCOUNTER
[facility] states attempted to visit pt at home. States pt refused service for today. States per pt's daughter, pt has been seeing Dr. Fabio Tyson for severe dehydration for 3 days straight. States please call if have any further questions.

## 2019-05-24 NOTE — TELEPHONE ENCOUNTER
Noted.MD chris Pham M.D.   [facility]  [address] [facility], [address] [place] [address], [address] [place] [address] - [phone]  [facility]  [phone]

## 2019-05-28 ENCOUNTER — TELEPHONE (OUTPATIENT)
Dept: FAMILY MEDICINE CLINIC | Age: 64
End: 2019-05-28

## 2019-05-28 DIAGNOSIS — F43.22 ADJUSTMENT DISORDER WITH ANXIOUS MOOD: Primary | ICD-10-CM

## 2019-05-28 DIAGNOSIS — E87.5 SERUM POTASSIUM ELEVATED: ICD-10-CM

## 2019-05-28 NOTE — TELEPHONE ENCOUNTER
Called to discuss lab results. K 5.2, asymptomatic, no chest pain, palpitations, shortness of breath, numbness, tingling, new nausea/vomiting. MAITE resolved. Per daughter only able to get 40 ounces of water per day. 3 shakes per day. Weight stable, but slowly decreasing. Throat pain. Last IV fluid 5/22/2019 with oncologist.     Hypotension:  Verapamil for BP and holding lisinopril for low BP, BP log most recently yesterday 90/50 110/59 with taking lisinopril and verapamil. Yesterday morning last lisinopril. Plan:  Only give verapamil of BP >130/>90 at night before medicine. Stop lisinopril. BMP in 1 week  Taking percocet for pain BID, decrease to 1/2 tab BID  Try lidocaine swish for oral pain  Max 3000 mg per 24 tylenol increase for pain  Return in 1 week      Kane Garcia M.D.   [address], [address] [place] [address], [address] [place] [address] [phone]  [place] [phone]  [phone]

## 2019-06-03 ENCOUNTER — TELEPHONE (OUTPATIENT)
Dept: FAMILY MEDICINE CLINIC | Age: 64
End: 2019-06-03

## 2019-06-04 NOTE — TELEPHONE ENCOUNTER
Spoke with daughter, informed her we do not have access to her prescription benefit plan. Daughter was able to inform me patient has [facility] scripts. Provider me with the rx id number. Prior Sammye Clap was initiated via cover my meds.  48 to 72 hours

## 2019-06-06 ENCOUNTER — HOSPITAL ENCOUNTER (OUTPATIENT)
Dept: LAB | Age: 64
Discharge: HOME | End: 2019-06-06
Payer: MEDICARE

## 2019-06-06 ENCOUNTER — OFFICE VISIT (OUTPATIENT)
Dept: FAMILY MEDICINE CLINIC | Age: 64
End: 2019-06-06

## 2019-06-06 VITALS
DIASTOLIC BLOOD PRESSURE: 60 MMHG | WEIGHT: 122.6 LBS | SYSTOLIC BLOOD PRESSURE: 127 MMHG | HEIGHT: 63 IN | TEMPERATURE: 97.7 F | OXYGEN SATURATION: 98 % | RESPIRATION RATE: 18 BRPM | BODY MASS INDEX: 21.72 KG/M2 | HEART RATE: 79 BPM

## 2019-06-06 DIAGNOSIS — I10 ESSENTIAL HYPERTENSION: ICD-10-CM

## 2019-06-06 DIAGNOSIS — E87.1 HYPONATREMIA: Primary | ICD-10-CM

## 2019-06-06 DIAGNOSIS — F43.22 ADJUSTMENT DISORDER WITH ANXIOUS MOOD: ICD-10-CM

## 2019-06-06 DIAGNOSIS — E87.1 HYPONATREMIA: ICD-10-CM

## 2019-06-06 PROBLEM — R79.89 LOW TSH LEVEL: Status: RESOLVED | Noted: 2019-05-09 | Resolved: 2019-06-06

## 2019-06-06 LAB
ANION GAP SERPL CALC-SCNC: 9 MMOL/L (ref 3–18)
BUN SERPL-MCNC: 30 MG/DL (ref 7–18)
BUN/CREAT SERPL: 33 (ref 12–20)
CALCIUM SERPL-MCNC: 9 MG/DL (ref 8.5–10.1)
CHLORIDE SERPL-SCNC: 95 MMOL/L (ref 100–108)
CO2 SERPL-SCNC: 28 MMOL/L (ref 21–32)
CREAT SERPL-MCNC: 0.92 MG/DL (ref 0.6–1.3)
GLUCOSE SERPL-MCNC: 124 MG/DL (ref 74–99)
POTASSIUM SERPL-SCNC: 4.9 MMOL/L (ref 3.5–5.5)
SODIUM SERPL-SCNC: 132 MMOL/L (ref 136–145)

## 2019-06-06 PROCEDURE — 82570 ASSAY OF URINE CREATININE: CPT

## 2019-06-06 PROCEDURE — 83930 ASSAY OF BLOOD OSMOLALITY: CPT

## 2019-06-06 PROCEDURE — 80048 BASIC METABOLIC PNL TOTAL CA: CPT

## 2019-06-06 PROCEDURE — 83935 ASSAY OF URINE OSMOLALITY: CPT

## 2019-06-06 PROCEDURE — 84300 ASSAY OF URINE SODIUM: CPT

## 2019-06-06 RX ORDER — ONDANSETRON 8 MG/1
TABLET, ORALLY DISINTEGRATING ORAL
Refills: 3 | COMMUNITY
Start: 2019-05-28 | End: 2019-12-29

## 2019-06-06 RX ORDER — PILOCARPINE HYDROCHLORIDE 5 MG/1
TABLET, FILM COATED ORAL
Refills: 2 | COMMUNITY
Start: 2019-05-31 | End: 2019-07-01

## 2019-06-06 RX ORDER — VERAPAMIL HYDROCHLORIDE 100 MG/1
100 CAPSULE, EXTENDED RELEASE ORAL
Qty: 90 CAP | Refills: 1 | Status: SHIPPED | OUTPATIENT
Start: 2019-06-06 | End: 2019-12-03

## 2019-06-06 NOTE — PROGRESS NOTES
Patient presents for lab draw ordered by:    Ordering Provider:  Dr. Eli Larry Department/Practice:  [facility]  Phone:  [phone]  Date Ordered:  6/6/19    The following labs were drawn and sent to [facility] by Stephane Polanco LPN:    BMP and Sodium, UR, Random; Creatinine, UR, Random, Osmolality, UR; Osmolality, Serum/Plasma    The following tubes were sent:    Gold  ( 2) Urine (1)    R Antecubital cleansed with aseptic technique. Specimen obtained by using a 23 gauge butterfly needle with 1 attempt. Patient tolerated well and voiced no complaints.

## 2019-06-06 NOTE — PATIENT INSTRUCTIONS
STRONGLY RECOMMEND SEEING A THERAPIST FOR PROCESSING ALL YOUR RECENT LIFE CHANGES 
IF YOU DECIDE YOU WANT TO SEE ONE PLEASE CALL CLINIC FOR REFERRAL 
 
FOR LOW SODIUM: 
Continue with goal water intake 50 oz per day Continue with increased food/diet intake as much as possible to prevent weight loss If you develop nausea, vomiting, tremors, increased tiredness, confusion please report immediately to hospital 
 
FOR BLOOD PRESSURE: 
Check blood pressure mid-day every day and write down Make sure you are seated for at least  5-10 minutes, legs uncrossed and back resting Bring log to clinic next visit If blood pressure consistently >140/>90 for 2-3 days in a row, call clinic to discuss medication adjustent If blood pressure <100/<60 please do not take verapamil at night Max salt per day 2 grams (2000 mg) Hyponatremia: Care Instructions Your Care Instructions Hyponatremia (say \"ga-aq-[id number]-TREE-louann-uh\") means that you don't have enough sodium in your blood. It can cause nausea, vomiting, and headaches. Or you may not feel hungry. In serious cases, it can cause seizures, a coma, or even death. Hyponatremia is not a disease. It is a problem caused by something else, such as medicines or exercising for a long time in hot weather. You can get hyponatremia if you lose a lot of fluids and then you drink a lot of water or other liquids that don't have much sodium. You can also get it if you have kidney, liver, heart, or other health problems. Treatment is focused on getting your sodium levels back to normal. 
Follow-up care is a key part of your treatment and safety. Be sure to make and go to all appointments, and call your doctor if you are having problems. It's also a good idea to know your test results and keep a list of the medicines you take. How can you care for yourself at home? · If your doctor recommends it, drink fluids that have sodium.  Sports drinks are a good choice. Or you can eat salty foods. · If your doctor recommends it, limit the amount of water you drink. And limit fluids that are mostly water. These include tea, coffee, and juice. · Take your medicines exactly as prescribed. Call your doctor if you have any problems with your medicine. · Get your sodium levels tested when your doctor tells you to. When should you call for help? Call 911 anytime you think you may need emergency care. For example, call if: 
  · You have a seizure.  
  · You passed out (lost consciousness).  
 Call your doctor now or seek immediate medical care if: 
  · You are confused or it is hard to focus.  
  · You have little or no appetite.  
  · You feel sick to your stomach or you vomit.  
  · You have a headache.  
  · You have mood changes.  
  · You feel more tired than usual.  
 Watch closely for changes in your health, and be sure to contact your doctor if: 
  · You do not get better as expected. Where can you learn more? Go to http://afshan-dick.info/. Enter X096 in the search box to learn more about \"Hyponatremia: Care Instructions. \" Current as of: June 25, 2018 Content Version: 11.9 © 8125-9662 [employer]. Care instructions adapted under license by [employer] (which disclaims liability or warranty for this information). If you have questions about a medical condition or this instruction, always ask your healthcare professional. [place] [address] any warranty or liability for your use of this information.

## 2019-06-06 NOTE — PROGRESS NOTES
Yasmani Snow is a 70 y.o. female presents in office for    Chief Complaint   Patient presents with    Hypertension        Health Maintenance Due   Topic Date Due    Shingrix Vaccine Age 50> (1 of 2) 10/19/2003       1. Have you been to the ER, urgent care clinic since your last visit? Hospitalized since your last visit? No    2. Have you seen or consulted any other health care providers outside of the [address] [place] [address] since your last visit? Include any pap smears or colon screening.  Dr. Caryle Che (radiation), Dr. Jesús Alves (chemotherapy), Dr. Hema Arita (ENT Surgeon)     Learning Assessment 4/25/2018   PRIMARY LEARNER Patient   HIGHEST LEVEL OF EDUCATION - PRIMARY LEARNER  SOME COLLEGE   PRIMARY LANGUAGE ENGLISH   LEARNER PREFERENCE PRIMARY LISTENING   ANSWERED BY JUNITO Wei   RELATIONSHIP SELF

## 2019-06-06 NOTE — PROGRESS NOTES
Internal Medicine Follow Up Visit Note    Chief Complaint   Patient presents with    Hypertension       HPI:  Willie Nicholas is a 70 y.o.  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 Anxiety and Depression: feels \"much better. \" Last xanax >1 month ago. Last percocet 1 week ago. Reports mood was worse prior to cancer diagnosis right after move to [facility] and home sick feeling. Moved to [place] because of feeling overwhelmed work stress. , felt empathy dropping 2/2 to stress. Poor PO intake/Low BP: Eating peaches and cottage cheese, mash potatoes, water with all meals. Getting 40-50 oz of water per day and drinking shakes 3 per day, high calorie 550 calories per shake. Down 2 lbs in past month. IV fluids infusion stopped last week after 3 days. No episodes of low BP since the last infusion. BP last 4-5 days 130s/60s. Verapamil at night. No LH or dizziness currently, did have some last week prior to IV fluids which has since resolved. No palpitations, shortness of breath or chest pain. Low energy chronicaly, unchanged. No numnbess, tingling or tremors. No nausea or stomach pain. Does have an appetite, no pain with eating. Current Outpatient Medications   Medication Sig    pilocarpine (SALAGEN) 5 mg tablet TAKE 1 TABLET BY MOUTH THREE TIMES A DAY FOR SALIVA    verapamil ER (VERELAN PM) 100 mg capsule Take 1 Cap by mouth nightly for 180 days.  fluticasone propionate (FLONASE NA) by Nasal route.  hydroxychloroquine (PLAQUENIL) 200 mg tablet Take 200 mg by mouth daily.  clotrimazole (MYCELEX) 10 mg rosanna     ALPRAZolam (XANAX) 0.5 mg tablet Take 0.5 mg by mouth.  atorvastatin (LIPITOR) 80 mg tablet Take 80 mg by mouth daily.     ondansetron (ZOFRAN ODT) 8 mg disintegrating tablet DISSOLVE 1 TABLET IN MOUTH AND SWALLOW 3 TIMES A DAY AS NEEDED FOR NAUSEA/VOMITING    hydrOXYzine HCl (ATARAX) 25 mg tablet Take 1 Tab by mouth two (2) times daily as needed for Anxiety for up to 90 days.  oxyCODONE-acetaminophen (PERCOCET) 5-325 mg per tablet Take 1 Tab by mouth two (2) times daily as needed for Pain. No current facility-administered medications for this visit. Health Maintenance   Topic Date Due    Shingrix Vaccine Age 49> (1 of 2) 10/19/2003    Influenza Age 5 to Adult  08/01/2019    HEMOGLOBIN A1C Q6M  11/16/2019    Pneumococcal 65+ years (2 of 2 - PPSV23) 11/20/2019    MEDICARE YEARLY EXAM  11/21/2019    LIPID PANEL Q1  11/23/2019    MICROALBUMIN Q1  04/04/2020    FOOT EXAM Q1  05/09/2020    GLAUCOMA SCREENING Q2Y  05/10/2020    EYE EXAM RETINAL OR DILATED  05/10/2020    BREAST CANCER SCRN MAMMOGRAM  04/12/2021    DTaP/Tdap/Td series (2 - Td) 06/01/2021    COLONOSCOPY  06/04/2023    Hepatitis C Screening  Completed    Bone Densitometry (Dexa) Screening  Completed     Immunization History   Administered Date(s) Administered    Influenza Vaccine 10/01/2017, 08/30/2018    Influenza Vaccine (Quad) PF 08/21/2018    Pneumococcal Conjugate (PCV-13) 11/20/2018    Pneumococcal Polysaccharide (PPSV-23) 11/18/2010    Tdap 06/01/2011       Allergies and Medications: Reviewed and updated in EMR.      Patient Active Problem List   Diagnosis Code    Carotid artery stenosis I65.29    Diabetes type 2, controlled ([address] E11.9    HLD (hyperlipidemia) E78.5    Essential hypertension I10    PAD (peripheral artery disease) (HCC) I73.9    Rheumatoid arthritis ([address] M06.9    S/P insertion of iliac artery stent Z95.828    Cataract H26.9    Hyponatremia E87.1    Diabetic polyneuropathy associated with type 2 diabetes mellitus ([address] E11.42    Adjustment disorder with anxious mood F43.22    Diverticulosis of large intestine without hemorrhage K57.30    Mild pulmonary hypertension (HCC) I27.20    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 OBJECTIVE:   Visit Vitals  /60 (BP 1 Location: Left arm, BP Patient Position: Sitting)   Pulse 79   Temp 97.7 °F (36.5 °C) (Oral)   Resp 18   Ht 5' 3\" (1.6 m)   Wt 122 lb 9.6 oz (55.6 kg)   SpO2 98%   BMI 21.72 kg/m²        BP Readings from Last 3 Encounters:   06/06/19 127/60   05/09/19 112/60   04/04/19 109/53     Wt Readings from Last 3 Encounters:   06/06/19 122 lb 9.6 oz (55.6 kg)   05/09/19 124 lb (56.2 kg)   04/04/19 129 lb 9.6 oz (58.8 kg)       General: Pleasant elderly woman in no acute distress  HEENT: Head is atraumatic normo-cephalic. CVS: Heart regular, rate, and rhythm. Audible S1 and S2. No murmurs, rubs or gallops. PULM: Lungs clear to auscultation bilaterally. No wheezes, rales or rhonchi. GI: Positive bowel sounds, soft, nondistended, non-tender. EXT: 2+ dorsalis pedis pulses bilaterally. No pedal edema bilaterally  Neuro: Alert and oriented x3. MSE: mood euthymic, affect congruent and reactive. No SI/HI. PHQ-9: 2    Nursing Notes Reviewed.     LABS/RADIOLOGICAL TESTS:  Lab Results   Component Value Date/Time    WBC 4.0 05/16/2019 01:19 PM    HGB 10.4 (L) 05/16/2019 01:19 PM    HCT 31.2 (L) 05/16/2019 01:19 PM    PLATELET 020 09/61/4030 01:19 PM     Lab Results   Component Value Date/Time    Sodium 132 (L) 06/06/2019 02:49 PM    Potassium 4.9 06/06/2019 02:49 PM    Chloride 95 (L) 06/06/2019 02:49 PM    CO2 28 06/06/2019 02:49 PM    Glucose 124 (H) 06/06/2019 02:49 PM    BUN 30 (H) 06/06/2019 02:49 PM    Creatinine 0.92 06/06/2019 02:49 PM     Lab Results   Component Value Date/Time    Cholesterol, total 157 11/23/2018 10:58 AM    HDL Cholesterol 94 (H) 11/23/2018 10:58 AM    LDL, calculated 49 (L) 11/23/2018 10:58 AM    Triglyceride 67 11/23/2018 10:58 AM     No results found for: GPT    All lab results and radiological studies were reviewed and discussed with the patient. ASSESSMENT/PLAN:      ICD-10-CM ICD-9-CM    1. Hyponatremia E87.1 276.1 SODIUM, UR, RANDOM      CREATININE, UR, RANDOM      OSMOLALITY, UR      OSMOLALITY, SERUM/PLASMA      METABOLIC PANEL, BASIC  Encouraged to increase water   2. Essential hypertension I10 401.9 verapamil ER (VERELAN PM) 100 mg capsule   3. Adjustment disorder with anxious mood F43.22 309.24 Continue current mgmt  Encouraged therapy, pt declines   AVS instructions:  STRONGLY RECOMMEND SEEING A THERAPIST FOR PROCESSING ALL YOUR RECENT LIFE CHANGES  IF YOU DECIDE YOU WANT TO SEE ONE PLEASE CALL CLINIC FOR REFERRAL    FOR LOW SODIUM:  Continue with goal water intake 50 oz per day  Continue with increased food/diet intake as much as possible to prevent weight loss  If you develop nausea, vomiting, tremors, increased tiredness, confusion please report immediately to hospital    FOR BLOOD PRESSURE:  Check blood pressure mid-day every day and write down  Make sure you are seated for at least  5-10 minutes, legs uncrossed and back resting  Bring log to clinic next visit  If blood pressure consistently >140/>90 for 2-3 days in a row, call clinic to discuss medication adjustent  If blood pressure <100/<60 please do not take verapamil at night  Max salt per day 2 grams (2000 mg)      Requested Prescriptions     Signed Prescriptions Disp Refills    verapamil ER (VERELAN PM) 100 mg capsule 90 Cap 1     Sig: Take 1 Cap by mouth nightly for 180 days. Patient verbalized understanding and agreement with the plan. Patient was given an after-visit summary. Follow-up and Dispositions    · Return in about 2 weeks (around 6/20/2019), or if symptoms worsen or fail to improve, for hypotension, low sodium. or sooner if worsening symptoms. More than 50% of this 45 min visit was spent counseling the patient face to face about etiology and treatment of health conditions outlined in assessment and plan. Jt Martin M.D.   [address] [place] [address] [facility]  [address] [place], [address] [place] [address], [address] [place] [address] -   [place] [phone]  [phone] normal...

## 2019-06-07 LAB
CREAT UR-MCNC: 74.8 MG/DL (ref 30–125)
OSMOLALITY SERPL: 290 MOSM/KG H2O (ref 280–301)
OSMOLALITY UR: 546 MOSM/KG H2O (ref 50–1400)
SODIUM UR-SCNC: 11 MMOL/L (ref 20–110)

## 2019-06-10 ENCOUNTER — TELEPHONE (OUTPATIENT)
Dept: FAMILY MEDICINE CLINIC | Age: 64
End: 2019-06-10

## 2019-06-10 NOTE — TELEPHONE ENCOUNTER
Pt called to give BP readings from the weekend: Friday, 6/7: Left arm 144/74 Right arm 1146/71, Sat, 6/8 L 110/63 R 166/74, Sunday, 6/9 L 140/70 R 167/74.

## 2019-06-11 NOTE — TELEPHONE ENCOUNTER
LPN please call patient and ask to come in for nurse visit BP check this week, check in both arms. Unclear numbers in PSR report of BP and appears to be significant difference between arms, unclear time duration between checks in both arms. Melissa Peralta M.D.   [place] [phone]  [facility] -   [phone]

## 2019-06-14 ENCOUNTER — CLINICAL SUPPORT (OUTPATIENT)
Dept: FAMILY MEDICINE CLINIC | Age: 64
End: 2019-06-14

## 2019-06-14 VITALS — HEART RATE: 80 BPM | DIASTOLIC BLOOD PRESSURE: 70 MMHG | SYSTOLIC BLOOD PRESSURE: 176 MMHG

## 2019-06-14 DIAGNOSIS — R03.0 ELEVATED BLOOD PRESSURE READING: Primary | ICD-10-CM

## 2019-06-14 NOTE — PROGRESS NOTES
Patient came to the office, as instructed by her provider, for a BP check. Patient states that her provider took her off all BP meds except Verapamil and that dose was decreased. Patient states that her provider instructed to come in today for a BP check because her BP readings at home were elevated. Provider is not in the office today but will discuss with Dr Adan Fox. Spoke to Dr Adan Fox. Per Dr Adan Fox, patient was instructed to take Lisinopril 10mg once daily and to schedule appt with Dr Aneudy Corral on Monday. Patient verbalized understanding. Routed to Dr Aneudy Corral for review.

## 2019-06-17 ENCOUNTER — OFFICE VISIT (OUTPATIENT)
Dept: FAMILY MEDICINE CLINIC | Age: 64
End: 2019-06-17

## 2019-06-17 VITALS
OXYGEN SATURATION: 97 % | HEIGHT: 63 IN | HEART RATE: 78 BPM | SYSTOLIC BLOOD PRESSURE: 97 MMHG | WEIGHT: 122.4 LBS | RESPIRATION RATE: 16 BRPM | TEMPERATURE: 98.4 F | BODY MASS INDEX: 21.69 KG/M2 | DIASTOLIC BLOOD PRESSURE: 52 MMHG

## 2019-06-17 DIAGNOSIS — I95.89 HYPOTENSION DUE TO HYPOVOLEMIA: ICD-10-CM

## 2019-06-17 DIAGNOSIS — E86.1 HYPOTENSION DUE TO HYPOVOLEMIA: ICD-10-CM

## 2019-06-17 DIAGNOSIS — I10 ESSENTIAL HYPERTENSION: ICD-10-CM

## 2019-06-17 DIAGNOSIS — E87.1 HYPONATREMIA: Primary | ICD-10-CM

## 2019-06-17 RX ORDER — LISINOPRIL 5 MG/1
TABLET ORAL DAILY
COMMUNITY
End: 2019-06-17 | Stop reason: DRUGHIGH

## 2019-06-17 RX ORDER — LISINOPRIL 2.5 MG/1
2.5 TABLET ORAL DAILY
Qty: 90 TAB | Refills: 0 | Status: SHIPPED | OUTPATIENT
Start: 2019-06-17 | End: 2019-09-06 | Stop reason: SDUPTHER

## 2019-06-17 NOTE — PATIENT INSTRUCTIONS
GET BLOOD WORK IN 1 WEEK FROM [facility] OR [facility]  START TAKING LISINOPRIL 2.5 MG IN MORNING, CONTINUE VERAPAMIL AT NIGHT  RETURN IN 2 WEEKS FOR BLOOD PRESSURE, BRING BLOOD PRESSURE LOG  SLOWLY INCREASE WATER TO GOAL OF 64 OUNCES PER DAY, TRY TO INCREASE BY 2 OUNCES EACH DAY UNTIL YOU REACH YOUR GOAL         Low Blood Pressure: Care Instructions  Your Care Instructions    Blood pressure is a measurement of the force of the blood against the walls of the blood vessels during and after each beat of the heart. Low blood pressure is also called hypotension. It means that your blood pressure is much lower than normal. Some people, especially young, slim women, may have slightly low blood pressure without symptoms. But in many people, low blood pressure can cause symptoms such as feeling dizzy or lightheaded. When your blood pressure is too low, your heart, brain, and other organs do not get enough blood. Low blood pressure can be caused by many things, including heart problems and some medicines. Diabetes that is not under control can cause your blood pressure to drop. And so can a severe allergic reaction or infection. Another cause is dehydration, which is when your body loses too much fluid. Treatment for low blood pressure depends on the cause. Follow-up care is a key part of your treatment and safety. Be sure to make and go to all appointments, and call your doctor if you are having problems. It's also a good idea to know your test results and keep a list of the medicines you take. How can you care for yourself at home? · Drink plenty of fluids, enough so that your urine is light yellow or clear like water. If you have kidney, heart, or liver disease and have to limit fluids, talk with your doctor before you increase the amount of fluids you drink. · Be safe with medicines. Call your doctor if you think you are having a problem with your medicine.  You will get more details on the specific medicines your doctor prescribes. · Stand up or get out of bed very slowly to allow your body to adjust.  · Get plenty of rest.  · Do not smoke. Smoking increases your risk of heart attack. If you need help quitting, talk to your doctor about stop-smoking programs and medicines. These can increase your chances of quitting for good. · Limit alcohol to 2 drinks a day for men and 1 drink a day for women. Alcohol may interfere with your medicine. In addition, alcohol can make your low blood pressure worse by causing your body to lose water. When should you call for help? Call 911 anytime you think you may need emergency care. For example, call if:    · You passed out (lost consciousness).    Call your doctor now or seek immediate medical care if:    · You are dizzy or lightheaded, or you feel like you may faint.    Watch closely for changes in your health, and be sure to contact your doctor if you have any problems. Where can you learn more? Go to http://afshan-dick.info/. Enter C304 in the search box to learn more about \"Low Blood Pressure: Care Instructions. \"  Current as of: July 22, 2018  Content Version: 11.9  © 6155-6587 [employer]. Care instructions adapted under license by [employer] (which disclaims liability or warranty for this information). If you have questions about a medical condition or this instruction, always ask your healthcare professional. [place] [address] any warranty or liability for your use of this information.

## 2019-06-17 NOTE — PROGRESS NOTES
[address] [facility] INTERNAL MEDICINE NOTE    Chief Complaint   Patient presents with    Medication Evaluation     BP medications       HPI: Adriana James is a 70 y.o. 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 Hypotension w/ h/o HTN: Taking verapamil and lisinopril 5 mg daily, last dose this AM. Lisinopril added back this past Friday 10 mg 6/14/2019 2/2 to uncontrolled HTN BP range at home 140s-160s/70s. Since then home BP 90s-100s/40s-50s. Reports no LH, dizziness, shortness of breath, palpitations or increased fatigue. Was taking xaanx and atarax but none of these medicines in the last week. Hypovolemic Hyponatremia: Water intake currently 50-53 ounces per day. Doing 3-4 shakes per day, snacking throughout the day. PO intake about the same since last visit. No nausea, vomiting, numbness or tingling . S/p oropharnygneal surgery: taking pilocarpine for past 2 weeks, TID, no change in saliva production noted. Past Medical Hx, Family Hx, Social Hx, Surgical Hx, Medications, Allergies: All reviewed and updated in EMR as appropriate. Current Outpatient Medications   Medication Sig    lisinopril (PRINIVIL, ZESTRIL) 2.5 mg tablet Take 1 Tab by mouth daily for 90 days.  ondansetron (ZOFRAN ODT) 8 mg disintegrating tablet DISSOLVE 1 TABLET IN MOUTH AND SWALLOW 3 TIMES A DAY AS NEEDED FOR NAUSEA/VOMITING    verapamil ER (VERELAN PM) 100 mg capsule Take 1 Cap by mouth nightly for 180 days.  fluticasone propionate (FLONASE NA) by Nasal route.  hydrOXYzine HCl (ATARAX) 25 mg tablet Take 1 Tab by mouth two (2) times daily as needed for Anxiety for up to 90 days.  hydroxychloroquine (PLAQUENIL) 200 mg tablet Take 200 mg by mouth daily.  ALPRAZolam (XANAX) 0.5 mg tablet Take 0.5 mg by mouth.  atorvastatin (LIPITOR) 80 mg tablet Take 80 mg by mouth daily.     pilocarpine (SALAGEN) 5 mg tablet TAKE 1 TABLET BY MOUTH THREE TIMES A DAY FOR SALIVA    clotrimazole (MYCELEX) 10 mg rosanna     oxyCODONE-acetaminophen (PERCOCET) 5-325 mg per tablet Take 1 Tab by mouth two (2) times daily as needed for Pain. No current facility-administered medications for this visit. Health Maintenance   Topic Date Due    Shingrix Vaccine Age 49> (1 of 2) 10/19/2003    Influenza Age 5 to Adult  08/01/2019    HEMOGLOBIN A1C Q6M  11/16/2019    Pneumococcal 65+ years (2 of 2 - PPSV23) 11/20/2019    MEDICARE YEARLY EXAM  11/21/2019    LIPID PANEL Q1  11/23/2019    MICROALBUMIN Q1  04/04/2020    FOOT EXAM Q1  05/09/2020    GLAUCOMA SCREENING Q2Y  05/10/2020    EYE EXAM RETINAL OR DILATED  05/10/2020    BREAST CANCER SCRN MAMMOGRAM  04/12/2021    DTaP/Tdap/Td series (2 - Td) 06/01/2021    COLONOSCOPY  06/04/2023    Hepatitis C Screening  Completed    Bone Densitometry (Dexa) Screening  Completed       OBJECTIVE:  Vitals:    06/17/19 1251 06/17/19 1256   BP: 99/45 97/52   Pulse: 78    Resp: 16    Temp: 98.4 °F (36.9 °C)    TempSrc: Oral    SpO2: 97%    Weight: 122 lb 6.4 oz (55.5 kg)    Height: 5' 3\" (1.6 m)        BP Readings from Last 3 Encounters:   06/17/19 97/52   06/14/19 176/70   06/06/19 127/60     Wt Readings from Last 3 Encounters:   06/17/19 122 lb 6.4 oz (55.5 kg)   06/06/19 122 lb 9.6 oz (55.6 kg)   05/09/19 124 lb (56.2 kg)       General: Pleasant in no acute distress  HEENT: Head is atraumatic normo-cephalic. CVSHeart regular, rate, and rhythm. Audible S1 and S2. No murmurs, rubs or gallops. PULM: Lungs clear to auscultation bilaterally. No wheezes, rales or rhonchi. EXT: 2+ dorsalis pedis pulses bilaterally. No pedal edema bilaterally  Neuro: Alert and oriented x3. Gait wnl  MSE: mood euthymic, affect congruent and reactive. No SI/HI.     LABS/IMAGING:  Recent Results (from the past 336 hour(s))   SODIUM, UR, RANDOM    Collection Time: 06/06/19  2:49 PM   Result Value Ref Range Sodium,urine random 11 (L) 20 - 110 MMOL/L   CREATININE, UR, RANDOM    Collection Time: 06/06/19  2:49 PM   Result Value Ref Range    Creatinine, urine 74.80 30 - 125 mg/dL   OSMOLALITY, UR    Collection Time: 06/06/19  2:49 PM   Result Value Ref Range    Osmolality,urine 546 50 - 1,400 MOSM/kg H2O   OSMOLALITY, SERUM/PLASMA    Collection Time: 06/06/19  2:49 PM   Result Value Ref Range    Osmolality, serum/plasma 290 280 - 301 MOSM/kg F6O   METABOLIC PANEL, BASIC    Collection Time: 06/06/19  2:49 PM   Result Value Ref Range    Sodium 132 (L) 136 - 145 mmol/L    Potassium 4.9 3.5 - 5.5 mmol/L    Chloride 95 (L) 100 - 108 mmol/L    CO2 28 21 - 32 mmol/L    Anion gap 9 3.0 - 18 mmol/L    Glucose 124 (H) 74 - 99 mg/dL    BUN 30 (H) 7.0 - 18 MG/DL    Creatinine 0.92 0.6 - 1.3 MG/DL    BUN/Creatinine ratio 33 (H) 12 - 20      GFR est AA >60 >60 ml/min/1.73m2    GFR est non-AA >60 >60 ml/min/1.73m2    Calcium 9.0 8.5 - 10.1 MG/DL       results reviewed by writer. Nursing Notes Reviewed    ASSESSMENT AND PLAN:    ICD-10-CM ICD-9-CM    1. Hyponatremia E87.1 276.1 RENAL FUNCTION PANEL   2. Essential hypertension I10 401.9 lisinopril (PRINIVIL, ZESTRIL) 2.5 mg tablet  BP log  Continue verapamil      RENAL FUNCTION PANEL   3. Hypotension due to hypovolemia I95.89 458.8 RENAL FUNCTION PANEL    E86.1 276.52 Slowly increase water intake, goal 64 ounces per day       No problem-specific Assessment & Plan notes found for this encounter. Orders Placed This Encounter    RENAL FUNCTION PANEL    DISCONTD: lisinopril (PRINIVIL, ZESTRIL) 5 mg tablet    lisinopril (PRINIVIL, ZESTRIL) 2.5 mg tablet       Future Appointments   Date Time Provider Omar Saldana   6/18/2019  1:30 PM [facility] PVL LAB 2 [facility]   7/1/2019 10:30 EDWINA Mejía MD [id number] [place] [address]       After visit summary provided to patient    Assessment and plan above discussed with patient, patient voiced understanding and agreement with plan.     More than 50% of this 25 min visit was spent face to face counseling the patient about the etiology and treatment options for the above health conditions outlined in assessment and plan    Ana María James M.D.   [address]   [facility] -   [phone]

## 2019-06-17 NOTE — PROGRESS NOTES
Chief Complaint   Patient presents with    Medication Evaluation     BP medications     1. Have you been to the ER, urgent care clinic since your last visit? Hospitalized since your last visit? No    2. Have you seen or consulted any other health care providers outside of the [address] [place] [address] since your last visit? Include any pap smears or colon screening.  No

## 2019-06-26 ENCOUNTER — TELEPHONE (OUTPATIENT)
Dept: FAMILY MEDICINE CLINIC | Age: 64
End: 2019-06-26

## 2019-06-26 NOTE — TELEPHONE ENCOUNTER
Drinking 50-60 ounces per day, trying to slowly increase to 64. 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 Per vascular use higher number to address BP recommendations. Today left 125/61  Today right 145/59    Home health nurse 112/68. Luke Zepeda M.D.   [place] [phone]  [place] [phone]  [phone]

## 2019-07-01 ENCOUNTER — OFFICE VISIT (OUTPATIENT)
Dept: FAMILY MEDICINE CLINIC | Age: 64
End: 2019-07-01

## 2019-07-01 VITALS
OXYGEN SATURATION: 100 % | HEART RATE: 69 BPM | RESPIRATION RATE: 16 BRPM | SYSTOLIC BLOOD PRESSURE: 118 MMHG | DIASTOLIC BLOOD PRESSURE: 60 MMHG | HEIGHT: 63 IN | WEIGHT: 122 LBS | TEMPERATURE: 97.7 F | BODY MASS INDEX: 21.62 KG/M2

## 2019-07-01 DIAGNOSIS — E87.1 HYPONATREMIA: Primary | ICD-10-CM

## 2019-07-01 DIAGNOSIS — I65.29 STENOSIS OF CAROTID ARTERY, UNSPECIFIED LATERALITY: ICD-10-CM

## 2019-07-01 DIAGNOSIS — F43.22 ADJUSTMENT DISORDER WITH ANXIOUS MOOD: ICD-10-CM

## 2019-07-01 DIAGNOSIS — G45.8 SUBCLAVIAN STEAL SYNDROME: ICD-10-CM

## 2019-07-01 DIAGNOSIS — I10 ESSENTIAL HYPERTENSION: ICD-10-CM

## 2019-07-01 NOTE — PROGRESS NOTES
Chief Complaint   Patient presents with    Hypertension     f/u    Other     Hypovolemic Hyponatremia f/u     1. Have you been to the ER, urgent care clinic since your last visit? Hospitalized since your last visit? No    2. Have you seen or consulted any other health care providers outside of the [address] [place] [address] since your last visit? Include any pap smears or colon screening.  No

## 2019-07-01 NOTE — PATIENT INSTRUCTIONS
FOR ANXIETY: 
Okay to take 1/2 tab XANAX nightly as needed, try to limit this medicine as can cause low blood pressure with goal to transition to hydroxyzine For hydroxyzine take 1 tab daily as needed for anxiety, do not mix with xanax If  By next visit you are still using xanax/hydroxyzine weekly ask about daily medicine to treat anxiety so you no longer need rescue medicines FOR LOW SODIUM: 
Continue to slowly work towards 64 ounces of water per day Continue to increase food intake, protein in diet FOR BLOOD PRESSURE: 
Continue current medications Follow up with vascular for artery disease FOR DRY MOUTH: 
Stop pilocarpine Continue throat lozenges and biotene mouth wash Hyponatremia: Care Instructions Your Care Instructions Hyponatremia (say \"ww-np-ria-TREE-louann-uh\") means that you don't have enough sodium in your blood. It can cause nausea, vomiting, and headaches. Or you may not feel hungry. In serious cases, it can cause seizures, a coma, or even death. Hyponatremia is not a disease. It is a problem caused by something else, such as medicines or exercising for a long time in hot weather. You can get hyponatremia if you lose a lot of fluids and then you drink a lot of water or other liquids that don't have much sodium. You can also get it if you have kidney, liver, heart, or other health problems. Treatment is focused on getting your sodium levels back to normal. 
Follow-up care is a key part of your treatment and safety. Be sure to make and go to all appointments, and call your doctor if you are having problems. It's also a good idea to know your test results and keep a list of the medicines you take. How can you care for yourself at home? · If your doctor recommends it, drink fluids that have sodium. Sports drinks are a good choice. Or you can eat salty foods. · If your doctor recommends it, limit the amount of water you drink.  And limit fluids that are mostly water. These include tea, coffee, and juice. · Take your medicines exactly as prescribed. Call your doctor if you have any problems with your medicine. · Get your sodium levels tested when your doctor tells you to. When should you call for help? Call 911 anytime you think you may need emergency care. For example, call if: 
  · You have a seizure.  
  · You passed out (lost consciousness).  
 Call your doctor now or seek immediate medical care if: 
  · You are confused or it is hard to focus.  
  · You have little or no appetite.  
  · You feel sick to your stomach or you vomit.  
  · You have a headache.  
  · You have mood changes.  
  · You feel more tired than usual.  
 Watch closely for changes in your health, and be sure to contact your doctor if: 
  · You do not get better as expected. Where can you learn more? Go to http://afshan-dick.info/. Enter H200 in the search box to learn more about \"Hyponatremia: Care Instructions. \" Current as of: June 25, 2018 Content Version: 11.9 © 6142-1907 [employer]. Care instructions adapted under license by [employer] (which disclaims liability or warranty for this information). If you have questions about a medical condition or this instruction, always ask your healthcare professional. [address] any warranty or liability for your use of this information.

## 2019-07-01 NOTE — PROGRESS NOTES
Internal Medicine Follow Up Visit Note    Chief Complaint   Patient presents with    Hypertension     f/u    Other     Hypovolemic Hyponatremia f/u       HPI:  Brina Alvarado is a 70 y.o. 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 Last seen 6/17/2019. Hypotension w/ h/o HTN: Taking verapamil 100 mg qHS last dose last night and lisinopril 2.5 mg daily, last dose this morning around 8AM.  Lisinopril decreased last visit given BP diastolic in 07O. BP log for past week revels averages /47-61 left arm, /51-67 right arm, pulses 60s-70s. Restarted pilocarpine with no improvement in dry mouth. Subclavian steal on left seen on ultrasound of carotids and right carotid artery stenosis. Hypovolemic Hyponatremia: Stable since last visit per lab work 132-131 sodium. BUN/Cr. Has increased to 58 ounces per day no vomiting related to higher water intake. Dentures Wednesday so plans to be able to change diet after receipt. Current Outpatient Medications   Medication Sig    lisinopril (PRINIVIL, ZESTRIL) 2.5 mg tablet Take 1 Tab by mouth daily for 90 days.  verapamil ER (VERELAN PM) 100 mg capsule Take 1 Cap by mouth nightly for 180 days.  fluticasone propionate (FLONASE NA) by Nasal route.  hydrOXYzine HCl (ATARAX) 25 mg tablet Take 1 Tab by mouth two (2) times daily as needed for Anxiety for up to 90 days.  hydroxychloroquine (PLAQUENIL) 200 mg tablet Take 200 mg by mouth daily.  clotrimazole (MYCELEX) 10 mg rosanna     ALPRAZolam (XANAX) 0.5 mg tablet Take 0.5 mg by mouth.  atorvastatin (LIPITOR) 80 mg tablet Take 80 mg by mouth daily.  ondansetron (ZOFRAN ODT) 8 mg disintegrating tablet DISSOLVE 1 TABLET IN MOUTH AND SWALLOW 3 TIMES A DAY AS NEEDED FOR NAUSEA/VOMITING     No current facility-administered medications for this visit.       Health Maintenance   Topic Date Due    Shingrix Vaccine Age 50> (1 of 2) 10/19/2003    Influenza Age 5 to Adult  08/01/2019    HEMOGLOBIN A1C Q6M  11/16/2019    Pneumococcal 65+ years (2 of 2 - PPSV23) 11/20/2019    MEDICARE YEARLY EXAM  11/21/2019    LIPID PANEL Q1  11/23/2019    MICROALBUMIN Q1  04/04/2020    FOOT EXAM Q1  05/09/2020    GLAUCOMA SCREENING Q2Y  05/10/2020    EYE EXAM RETINAL OR DILATED  05/10/2020    BREAST CANCER SCRN MAMMOGRAM  04/12/2021    DTaP/Tdap/Td series (2 - Td) 06/01/2021    COLONOSCOPY  06/04/2023    Hepatitis C Screening  Completed    Bone Densitometry (Dexa) Screening  Completed     Immunization History   Administered Date(s) Administered    Influenza Vaccine 10/01/2017, 08/30/2018    Influenza Vaccine (Quad) PF 08/21/2018    Pneumococcal Conjugate (PCV-13) 11/20/2018    Pneumococcal Polysaccharide (PPSV-23) 11/18/2010    Tdap 06/01/2011       Allergies and Medications: Reviewed and updated in EMR. Patient Active Problem List   Diagnosis Code    Carotid artery stenosis I65.29    Diabetes type 2, controlled ([facility] [address] E11.9    HLD (hyperlipidemia) E78.5    Essential hypertension I10    PAD (peripheral artery disease) (HCC) I73.9    Rheumatoid arthritis ([facility] [address] M06.9    S/P insertion of iliac artery stent Z95.828    Cataract H26.9    Hyponatremia E87.1    Diabetic polyneuropathy associated with type 2 diabetes mellitus ([facility] [address] E11.42    Adjustment disorder with anxious mood F43.22    Diverticulosis of large intestine without hemorrhage K57.30    Mild pulmonary hypertension (HCC) I27.20    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       OBJECTIVE:   Visit Vitals  /60 (BP 1 Location: Left arm)   Pulse 69   Temp 97.7 °F (36.5 °C) (Oral)   Resp 16   Ht 5' 3\" (1.6 m)   Wt 122 lb (55.3 kg)   SpO2 100%   BMI 21.61 kg/m²        BP Readings from Last 3 Encounters:   07/01/19 118/60   06/17/19 97/52   06/14/19 176/70     Wt Readings from Last 3 Encounters:   07/01/19 122 lb (55.3 kg)   06/17/19 122 lb 6.4 oz (55.5 kg)   06/06/19 122 lb 9.6 oz (55.6 kg)       General: Pleasant in no acute distress  HEENT: Head is atraumatic normo-cephalic. CVSHeart regular, rate, and rhythm. Audible S1 and S2. No murmurs, rubs or gallops. PULM: Lungs clear to auscultation bilaterally. No wheezes, rales or rhonchi. EXT: 2+ dorsalis pedis pulses bilaterally. No pedal edema bilaterally  Neuro: Alert and oriented x3. Gait wnl  MSE: mood euthymic, affect congruent and reactive. No SI/HI. Nursing Notes Reviewed. LABS/RADIOLOGICAL TESTS:      Lab Results   Component Value Date/Time    WBC 4.0 05/16/2019 01:19 PM    HGB 10.4 (L) 05/16/2019 01:19 PM    HCT 31.2 (L) 05/16/2019 01:19 PM    PLATELET 910 70/39/7202 01:19 PM     Lab Results   Component Value Date/Time    Sodium 131 (L) 06/26/2019 10:11 AM    Potassium 4.0 06/26/2019 10:11 AM    Chloride 94 (L) 06/26/2019 10:11 AM    CO2 29 06/26/2019 10:11 AM    Glucose 119 (H) 06/26/2019 10:11 AM    BUN 35 (H) 06/26/2019 10:11 AM    Creatinine 0.9 06/26/2019 10:11 AM     6/6/2019 11:11 PM - [place], Lab In [facility]   Component Value Flag Ref Range Units Status   Sodium 132  Low   136 - 145 mmol/L Final   Potassium 4.9   3.5 - 5.5 mmol/L Final   Chloride 95  Low   100 - 108 mmol/L Final   CO2 28   21 - 32 mmol/L Final   Anion gap 9   3.0 - 18 mmol/L Final   Glucose 124  High   74 - 99 mg/dL Final   BUN 30  High   7.0 - 18 MG/DL Final   Creatinine 0.92   0.6 - 1.3 MG/DL Final   BUN/Creatinine ratio 33  High   12 - 20   Final   GFR est AA >60   >60 ml/min/1.73m2 Final   GFR est non-AA >60   >60 ml/min/1.73m2 Final   Comment:   Calcium 9.0   8.5 - 10.1 MG/DL Final       All lab results and radiological studies were reviewed and discussed with the patient. ASSESSMENT/PLAN:      ICD-10-CM ICD-9-CM    1.  Hyponatremia E87.1 276.1 Continue to work on Job1001 and water increased intake   2. Stenosis of carotid artery, unspecified laterality I65.29 433.10 Continue current mgmt  Fu with vascular   3. Essential hypertension I10 401.9 Continue current mgmt   4. Adjustment disorder with anxious mood F43.22 309.24 Continue prn anti-anxiety medicaitons, consider SSRI if still needing on follow up vs mirtazapine   5. Subclavian steal syndrome G45.8 435.2 Continue BP control  F/u with vascular   AVS INSTRUCTIONS:  FOR ANXIETY:  Okay to take 1/2 tab XANAX nightly as needed, try to limit this medicine as can cause low blood pressure with goal to transition to hydroxyzine  For hydroxyzine take 1 tab daily as needed for anxiety, do not mix with xanax  If  By next visit you are still using xanax/hydroxyzine weekly ask about daily medicine to treat anxiety so you no longer need rescue medicines    FOR LOW SODIUM:  Continue to slowly work towards 64 ounces of water per day  Continue to increase food intake, protein in diet    FOR BLOOD PRESSURE:  Continue current medications  Follow up with vascular for artery disease    FOR DRY MOUTH:  Stop pilocarpine  Continue throat lozenges and biotene mouth wash    Requested Prescriptions      No prescriptions requested or ordered in this encounter     Patient verbalized understanding and agreement with the plan. Patient was given an after-visit summary. Follow-up and Dispositions    · Return in about 3 weeks (around 7/22/2019) for establish care with Dr. Burke Bernheim. or sooner if worsening symptoms. More than 50% of this 45 min visit was spent counseling the patient face to face about etiology and treatment of health conditions outlined in assessment and plan. Filipe Rehman M.D.   [address], [address] [place] [address], [address] [place] [address] -   [facility]   [phone]

## 2019-07-03 PROBLEM — G45.8 SUBCLAVIAN STEAL SYNDROME: Status: ACTIVE | Noted: 2019-07-03

## 2019-08-02 ENCOUNTER — OFFICE VISIT (OUTPATIENT)
Dept: FAMILY MEDICINE CLINIC | Age: 64
End: 2019-08-02

## 2019-08-02 VITALS
WEIGHT: 119 LBS | HEART RATE: 70 BPM | RESPIRATION RATE: 18 BRPM | DIASTOLIC BLOOD PRESSURE: 58 MMHG | SYSTOLIC BLOOD PRESSURE: 118 MMHG | BODY MASS INDEX: 21.09 KG/M2 | TEMPERATURE: 97.5 F | OXYGEN SATURATION: 100 % | HEIGHT: 63 IN

## 2019-08-02 DIAGNOSIS — E87.1 HYPONATREMIA: ICD-10-CM

## 2019-08-02 DIAGNOSIS — I10 ESSENTIAL HYPERTENSION: ICD-10-CM

## 2019-08-02 DIAGNOSIS — G45.8 SUBCLAVIAN STEAL SYNDROME: ICD-10-CM

## 2019-08-02 DIAGNOSIS — I10 ESSENTIAL HYPERTENSION: Primary | ICD-10-CM

## 2019-08-02 DIAGNOSIS — F41.9 ANXIETY: ICD-10-CM

## 2019-08-02 RX ORDER — DULOXETIN HYDROCHLORIDE 20 MG/1
CAPSULE, DELAYED RELEASE ORAL
Refills: 2 | COMMUNITY
Start: 2019-07-12 | End: 2019-08-07 | Stop reason: SDUPTHER

## 2019-08-02 RX ORDER — CLOTRIMAZOLE 10 MG/1
10 LOZENGE ORAL; TOPICAL DAILY
Qty: 90 TAB | Refills: 3 | Status: SHIPPED | OUTPATIENT
Start: 2019-08-02 | End: 2021-04-05

## 2019-08-02 RX ORDER — CLONAZEPAM 0.5 MG/1
0.5 TABLET ORAL 2 TIMES DAILY
Qty: 60 TAB | Refills: 0 | Status: SHIPPED | OUTPATIENT
Start: 2019-08-02 | End: 2019-08-06 | Stop reason: SDUPTHER

## 2019-08-02 NOTE — PROGRESS NOTES
HISTORY OF PRESENT ILLNESS  Lillian Clements is a 70 y.o. female. Patient Is here to follow up on her hypertension and she has been taking her medications. She has been drinking a lot more water. She has been following up with her oncologist and she is stable at present. She would also like to continue her anxiety medications. Hypertension    The history is provided by the patient. This is a chronic problem. The problem has been gradually improving. Pertinent negatives include no chest pain, no orthopnea, no palpitations, no anxiety, no confusion, no malaise/fatigue, no blurred vision, no headaches, no peripheral edema, no dizziness, no shortness of breath, no nausea and no vomiting. Risk factors include hypertension and stress. Review of Systems   Constitutional: Positive for weight loss. Negative for chills, fever and malaise/fatigue. HENT: Negative for congestion, ear discharge, ear pain, hearing loss, sinus pain and sore throat. Eyes: Negative for blurred vision, double vision and discharge. Respiratory: Negative for sputum production, shortness of breath and wheezing. Cardiovascular: Negative for chest pain, palpitations, orthopnea and leg swelling. Gastrointestinal: Negative for blood in stool, constipation, heartburn, nausea and vomiting. Genitourinary: Negative for dysuria, hematuria and urgency. Musculoskeletal: Negative for back pain, joint pain and myalgias. Neurological: Negative for dizziness, tingling, focal weakness, weakness and headaches. Endo/Heme/Allergies: Negative for environmental allergies. Psychiatric/Behavioral: Positive for depression. Negative for confusion. The patient is nervous/anxious. Physical Exam   Constitutional: She is oriented to person, place, and time. She appears well-developed and well-nourished. No distress. HENT:   Head: Normocephalic and atraumatic.    Right Ear: External ear normal.   Left Ear: External ear normal. Mouth/Throat: Oropharynx is clear and moist. No oropharyngeal exudate. Eyes: Pupils are equal, round, and reactive to light. EOM are normal. No scleral icterus. Neck: Normal range of motion. No thyromegaly present. Cardiovascular: Normal rate, regular rhythm and normal heart sounds. Pulmonary/Chest: Effort normal and breath sounds normal. No respiratory distress. She has no wheezes. Abdominal: Soft. Bowel sounds are normal. She exhibits no distension. There is no tenderness. Lymphadenopathy:     She has no cervical adenopathy. Neurological: She is alert and oriented to person, place, and time. Psychiatric: She has a normal mood and affect. ASSESSMENT and PLAN  Hypertension :  1) Goal blood pressure less than equal to 140/90 mmHg, goal BP can vary depending on risk factors as discussed. 2) Lifestyle modifications discussed with patient, low sodium <2 gm, low salt , DASH diet  3) Exercise for at least 30 min 3-5 times a week for goal BMI of less than or equal  To 25.  4) Continue current medications as prescribed. 5) Please begin medication as discussed for better blood pressure control, explained side effects and patient verbalized understanding. 6) Goal LDL<100.  7) Please monitor your blood pressure and keep a log to bring in with you at each visit. 8) Discussed risk factors with patient such as CAD, FAST of stroke symptoms, pt verbalizes understanding. 9) Please avoid smoking , alcohol and any illicit drug use if applicable to you. 10) Discussed lifestyle modifications ,dietary control and BP monitoring at home     Hyponatremia :  - ordered some blood work     Anxiety :  1) Please do not take more  clonazepam than the reccommended dose, explained side effects and patient verbalized understanding.

## 2019-08-02 NOTE — PROGRESS NOTES
Patient is here to transfer care to new pcp. ..1. Have you been to the ER, urgent care clinic since your last visit? Hospitalized since your last visit?no    2. Have you seen or consulted any other health care providers outside of the [address] [place] [address] since your last visit?   Include any pap smears or colon screening no

## 2019-08-05 ENCOUNTER — TELEPHONE (OUTPATIENT)
Dept: FAMILY MEDICINE CLINIC | Age: 64
End: 2019-08-05

## 2019-08-05 RX ORDER — LISINOPRIL 10 MG/1
TABLET ORAL
Qty: 90 TAB | Refills: 0 | Status: SHIPPED | OUTPATIENT
Start: 2019-08-05 | End: 2019-09-13 | Stop reason: DRUGHIGH

## 2019-08-06 DIAGNOSIS — F41.9 ANXIETY: ICD-10-CM

## 2019-08-07 RX ORDER — CLONAZEPAM 0.5 MG/1
TABLET ORAL
Qty: 60 TAB | Refills: 0 | Status: SHIPPED | OUTPATIENT
Start: 2019-08-07 | End: 2019-08-14 | Stop reason: SDUPTHER

## 2019-08-08 RX ORDER — DULOXETIN HYDROCHLORIDE 20 MG/1
20 CAPSULE, DELAYED RELEASE ORAL DAILY
Qty: 90 CAP | Refills: 2 | Status: SHIPPED | OUTPATIENT
Start: 2019-08-08 | End: 2021-04-05

## 2019-08-14 DIAGNOSIS — F41.9 ANXIETY: ICD-10-CM

## 2019-08-15 RX ORDER — CLONAZEPAM 0.5 MG/1
TABLET ORAL
Qty: 60 TAB | Refills: 0 | Status: SHIPPED | OUTPATIENT
Start: 2019-08-15 | End: 2019-09-30 | Stop reason: SDUPTHER

## 2019-08-28 ENCOUNTER — TELEPHONE (OUTPATIENT)
Dept: FAMILY MEDICINE CLINIC | Age: 64
End: 2019-08-28

## 2019-08-28 NOTE — TELEPHONE ENCOUNTER
Home health nurse called and stated that patients blood pressure readings are 158/78 and 171/81, her pressure usually goes down when she takes her lisinopril however it hasnt.  Requesting patient be seen sooner for her blood pressure versus november

## 2019-08-30 NOTE — TELEPHONE ENCOUNTER
She should be taking her lisinopril and is aware of hr blood pressure are more than 140/90 she will need to do so.

## 2019-09-05 ENCOUNTER — OFFICE VISIT (OUTPATIENT)
Dept: FAMILY MEDICINE CLINIC | Age: 64
End: 2019-09-05

## 2019-09-05 VITALS
HEIGHT: 63 IN | DIASTOLIC BLOOD PRESSURE: 72 MMHG | RESPIRATION RATE: 16 BRPM | SYSTOLIC BLOOD PRESSURE: 160 MMHG | WEIGHT: 118 LBS | OXYGEN SATURATION: 100 % | BODY MASS INDEX: 20.91 KG/M2 | TEMPERATURE: 97.3 F

## 2019-09-05 NOTE — PROGRESS NOTES
Chief Complaint   Patient presents with    Hypertension     high morning blood pressures     1. Have you been to the ER, urgent care clinic since your last visit? Hospitalized since your last visit? No    2. Have you seen or consulted any other health care providers outside of the [address] [place] [address] since your last visit? Include any pap smears or colon screening.  No

## 2019-09-06 DIAGNOSIS — I10 ESSENTIAL HYPERTENSION: ICD-10-CM

## 2019-09-11 NOTE — TELEPHONE ENCOUNTER
This was requested on 9/6/19. Daughter states medication was changed from 2.5 mg once a day to 2.5 mg twice a day. [id number] [address] [place]. Pt has 6 days remaining.

## 2019-09-13 RX ORDER — LISINOPRIL 2.5 MG/1
2.5 TABLET ORAL 2 TIMES DAILY
Qty: 180 TAB | Refills: 2 | Status: SHIPPED | OUTPATIENT
Start: 2019-09-13

## 2019-09-13 NOTE — TELEPHONE ENCOUNTER
recevied verbal order to send over lisinopril 2.5 mg taken 2 times a day for htn    #tabs 180  #refills 2

## 2019-09-16 DIAGNOSIS — E87.1 HYPONATREMIA: ICD-10-CM

## 2019-09-16 DIAGNOSIS — I10 ESSENTIAL HYPERTENSION: ICD-10-CM

## 2019-09-30 DIAGNOSIS — F41.9 ANXIETY: ICD-10-CM

## 2019-09-30 RX ORDER — CLONAZEPAM 0.5 MG/1
TABLET ORAL
Qty: 60 TAB | Refills: 0 | Status: SHIPPED | OUTPATIENT
Start: 2019-09-30 | End: 2019-10-23 | Stop reason: SDUPTHER

## 2019-10-01 ENCOUNTER — TELEPHONE (OUTPATIENT)
Dept: FAMILY MEDICINE CLINIC | Age: 64
End: 2019-10-01

## 2019-10-23 DIAGNOSIS — F41.9 ANXIETY: ICD-10-CM

## 2019-10-24 RX ORDER — CLONAZEPAM 0.5 MG/1
TABLET ORAL
Qty: 60 TAB | Refills: 0 | Status: SHIPPED | OUTPATIENT
Start: 2019-10-24 | End: 2019-11-22 | Stop reason: SDUPTHER

## 2019-10-28 PROBLEM — C76.0 CANCER OF HEAD AND NECK (HCC): Status: ACTIVE | Noted: 2019-10-28

## 2019-10-28 PROBLEM — R56.9 SEIZURE (HCC): Status: ACTIVE | Noted: 2019-10-28

## 2019-10-29 ENCOUNTER — TELEPHONE (OUTPATIENT)
Dept: FAMILY MEDICINE CLINIC | Age: 64
End: 2019-10-29

## 2019-10-29 NOTE — TELEPHONE ENCOUNTER
Prior Shakila King has been initiated, 70  Turnaround time for approval from  [facility]. Prior auth initiated via cover my meds.

## 2019-10-31 ENCOUNTER — TELEPHONE (OUTPATIENT)
Dept: FAMILY MEDICINE CLINIC | Age: 64
End: 2019-10-31

## 2019-10-31 NOTE — TELEPHONE ENCOUNTER
[facility] is requesting to speak w/ LPN. Stated BP was 82/40 in the left arm & 102/50 in the right.

## 2019-11-01 ENCOUNTER — PATIENT OUTREACH (OUTPATIENT)
Dept: CASE MANAGEMENT | Age: 64
End: 2019-11-01

## 2019-11-01 NOTE — TELEPHONE ENCOUNTER
Patient stated she just got out of the ICU. She has an appt with you on Tuesday on 11/5.  She said they changed her medications and needs to discucss with you and she rather just wait to talk to you then

## 2019-11-01 NOTE — PROGRESS NOTES
Hospital Discharge Follow-Up      Date/Time:  11/1/2019 10:30 AM    Patient was admitted to [facility] on 10/27/19 and discharged on 10/30/19 for seizure. The physician discharge summary was available at the time of outreach. Patient was contacted within 2 business days of discharge. Contacted patient for Transitions of Care Coordination  follow up. Call answered by patient's daughter, Rosa Cameron. Provided introduction and reason for call. daughter verbalized patient was receiving IV for her CAT scan. Writer provided direct contact info. If return call is not received, Manfred Gordillo will attempt to contact at a later time.

## 2019-11-05 ENCOUNTER — OFFICE VISIT (OUTPATIENT)
Dept: FAMILY MEDICINE CLINIC | Age: 64
End: 2019-11-05

## 2019-11-05 VITALS
DIASTOLIC BLOOD PRESSURE: 64 MMHG | RESPIRATION RATE: 16 BRPM | SYSTOLIC BLOOD PRESSURE: 133 MMHG | OXYGEN SATURATION: 99 % | WEIGHT: 118 LBS | HEART RATE: 66 BPM | BODY MASS INDEX: 20.91 KG/M2 | TEMPERATURE: 95.3 F | HEIGHT: 63 IN

## 2019-11-05 DIAGNOSIS — Z00.00 MEDICARE ANNUAL WELLNESS VISIT, SUBSEQUENT: ICD-10-CM

## 2019-11-05 DIAGNOSIS — I10 ESSENTIAL HYPERTENSION: ICD-10-CM

## 2019-11-05 DIAGNOSIS — G40.909 SEIZURE DISORDER (HCC): Primary | ICD-10-CM

## 2019-11-05 DIAGNOSIS — Z09 HOSPITAL DISCHARGE FOLLOW-UP: ICD-10-CM

## 2019-11-05 DIAGNOSIS — F33.1 MDD (MAJOR DEPRESSIVE DISORDER), RECURRENT EPISODE, MODERATE (HCC): ICD-10-CM

## 2019-11-05 NOTE — PROGRESS NOTES
Chucho Knowles presents today for   Chief Complaint   Patient presents with   [facility] Follow Up     Seizure       Is someone accompanying this pt? Daughter - Mir Verdin    Is the patient using any DME equipment during [address]? no    Depression Screening:  3 most recent PHQ Screens 11/5/2019   PHQ Not Done Active Diagnosis of Depression or Bipolar Disorder   Little interest or pleasure in doing things Not at all   Feeling down, depressed, irritable, or hopeless Not at all   Total Score PHQ 2 0   Trouble falling or staying asleep, or sleeping too much More than half the days   Feeling tired or having little energy Nearly every day   Poor appetite, weight loss, or overeating Nearly every day   Feeling bad about yourself - or that you are a failure or have let yourself or your family down Not at all   Trouble concentrating on things such as school, work, reading, or watching TV Not at all   Moving or speaking so slowly that other people could have noticed; or the opposite being so fidgety that others notice More than half the days   Thoughts of being better off dead, or hurting yourself in some way Not at all   PHQ 9 Score 10   How difficult have these problems made it for you to do your work, take care of your home and get along with others -       Learning Assessment:  Learning Assessment 4/25/2018   PRIMARY LEARNER Patient   HIGHEST LEVEL OF EDUCATION - PRIMARY LEARNER  SOME COLLEGE   PRIMARY LANGUAGE ENGLISH   LEARNER PREFERENCE PRIMARY LISTENING   ANSWERED BY JUNITO Wu   RELATIONSHIP SELF       Abuse Screening:  Abuse Screening Questionnaire 4/25/2018   Do you ever feel afraid of your partner? N   Are you in a relationship with someone who physically or mentally threatens you? N   Is it safe for you to go home? Y       Fall Risk  Fall Risk Assessment, last 12 mths 11/5/2019   Able to walk? Yes   Fall in past 12 months?  No   Fall with injury? -   Number of falls in past 12 months -   Fall Risk Score -       Health Maintenance reviewed     Health Maintenance Due   Topic Date Due    Shingrix Vaccine Age 49> (1 of 2) 10/19/2003    Influenza Age 5 to Adult  08/01/2019    HEMOGLOBIN A1C Q6M  11/16/2019    MEDICARE YEARLY EXAM  11/21/2019    LIPID PANEL Q1  11/23/2019   . Coordination of Care:  1. Have you been to the ER, urgent care clinic since your last visit? Hospitalized since your last visit? Yes [address]    2. Have you seen or consulted any other health care providers outside of the [address] [place] [address] since your last visit? Include any pap smears or colon screening.  Yes [facility] - Seizure      Last  Checked n/a  Last UDS Checked n/a  Last Pain contract signed: n/a

## 2019-11-05 NOTE — PROGRESS NOTES
HISTORY OF PRESENT ILLNESS  Matty Alonzo is a 75 y.o. female. Patient is here to follow up after she was recently seen on 10/19 at [facility] secondary to :   headache, seizures  and vomiting and her daughter gave her  Zofran without much improvement. Patient  Was confused. On presentation patient has Seizure in ED. CT head -ve for acute findings, official reading pending. Neurologist believed presentation is due to Seizures. Keppra was initiated at this hospitalization. Patient was drinking wine the nights prior to this. Left sided weakness was also present. She has an apt with nuerology in December. Patient was also told her blood pressure medication had to be adjusted and she is on a mild dose at present. She is also due to have a subsequent medicare wellness exam.    Hospital Follow Up   The history is provided by the patient. This is a new problem. The current episode started more than 1 week ago. The problem occurs constantly. The problem has been gradually improving. Associated symptoms include headaches. Pertinent negatives include no chest pain, no abdominal pain and no shortness of breath. The symptoms are aggravated by stress. The symptoms are relieved by medications. Treatments tried: on meds. Seizure    The history is provided by the patient. This is a new problem. The current episode started more than 1 week ago. The problem has been gradually improving. There was 1 seizure. The most recent episode lasted 30 to 120 seconds. Associated symptoms include confusion, headaches and speech difficulty. Pertinent negatives include no sore throat, no chest pain, no cough, no nausea and no vomiting. Characteristics include loss of consciousness. The episode was witnessed. There was the sensation of an aura present. There was return to baseline postseizure. The seizures continued in the ED. The seizure(s) had left-sided focality. Possible causes include stress. There has been no fever.   She reports confusion, headaches, speech difficulty. She reports no chest pain, no vomiting, no sore throat, no cough. Home seizure medications include: Keppra. Hypertension    The history is provided by the patient. This is a chronic problem. The problem has been gradually improving. Associated symptoms include anxiety, confusion, malaise/fatigue and headaches. Pertinent negatives include no chest pain, no palpitations, no blurred vision, no tinnitus, no neck pain, no peripheral edema, no dizziness, no shortness of breath, no nausea and no vomiting. Risk factors include hypertension and stress. Review of Systems   Constitutional: Positive for malaise/fatigue. Negative for fever. HENT: Negative for congestion, ear pain, sinus pain, sore throat and tinnitus. Eyes: Negative for blurred vision, double vision, pain and discharge. Respiratory: Negative for cough, sputum production, shortness of breath and wheezing. Cardiovascular: Negative for chest pain, palpitations, claudication and leg swelling. Gastrointestinal: Negative for abdominal pain, constipation, heartburn, nausea and vomiting. Genitourinary: Negative for dysuria, frequency and hematuria. Musculoskeletal: Negative for joint pain and neck pain. Skin: Negative for rash. Neurological: Positive for focal weakness, seizures, loss of consciousness, speech difficulty and headaches. Negative for dizziness. Endo/Heme/Allergies: Negative for environmental allergies. Psychiatric/Behavioral: Positive for confusion and depression. Negative for hallucinations, substance abuse and suicidal ideas. The patient is nervous/anxious. Visit Vitals  /64   Pulse 66   Temp 95.3 °F (35.2 °C) (Oral)   Resp 16   Ht 5' 3\" (1.6 m)   Wt 118 lb (53.5 kg)   SpO2 99%   BMI 20.90 kg/m²       Physical Exam   Constitutional: She is oriented to person, place, and time. She appears well-developed and well-nourished. No distress. HENT:   Head: Normocephalic and atraumatic. Right Ear: External ear normal.   Left Ear: External ear normal.   Mouth/Throat: Oropharynx is clear and moist. No oropharyngeal exudate. Eyes: Pupils are equal, round, and reactive to light. EOM are normal. No scleral icterus. Neck: No thyromegaly present. Cardiovascular: Normal rate, regular rhythm and normal heart sounds. Pulmonary/Chest: Effort normal and breath sounds normal. No respiratory distress. She has no wheezes. Abdominal: Soft. Bowel sounds are normal. She exhibits no distension. There is no tenderness. Lymphadenopathy:     She has no cervical adenopathy. Neurological: She is alert and oriented to person, place, and time. Psychiatric: She has a normal mood and affect. ASSESSMENT and PLAN  Seizure disorder :  - discussed findings of mri brain   - she is currently on Keppra   - Please continue Aspirin 81 mg per day. - Goal blood pressure less than equal to 140/90 mmHg, goal BP can vary depending on risk factors as discussed. -  Lifestyle modifications discussed with patient, low sodium <2 gm, low salt , DASH diet  - Exercise for at least 30 min 3-5 times a week for goal BMI of less than or equal  To 25.  - Please begin and continue medication as discussed for better blood pressure control, explained side effects and patient verbalized understanding.  - Goal LDL<100.  -Please monitor your blood pressure and keep a log to bring in with you at each visit.  -Discussed risk factors with patient such as CAD, FAST of stroke symptoms, pt verbalizes understanding.  - Please avoid smoking , alcohol and any illicit drug use if applicable to you. Hypertension :  1) Goal blood pressure less than equal to 140/90 mmHg, goal BP can vary depending on risk factors as discussed.   2) Lifestyle modifications discussed with patient, low sodium <2 gm, low salt , DASH diet  3) Exercise for at least 30 min 3-5 times a week for goal BMI of less than or equal  To 25.  4) Continue current medications as prescribed. 5) Please begin medication as discussed for better blood pressure control, explained side effects and patient verbalized understanding. 6) Goal LDL<100.  7) Please monitor your blood pressure and keep a log to bring in with you at each visit. 8) Discussed risk factors with patient such as CAD, FAST of stroke symptoms, pt verbalizes understanding. 9) Please avoid smoking , alcohol and any illicit drug use if applicable to you. 10) Discussed lifestyle modifications ,dietary control and BP monitoring at home       Will order blood work at next visit.   Discussed shingles and pneumonia vaccination at the pharmacy     Depression and anxiety :  - has a diagnosis of this already and is stable at present  - patient does not wish to see a behavioral health provider  - currently on Cymbalta and xanax

## 2019-11-06 NOTE — PATIENT INSTRUCTIONS
Medicare Wellness Visit, Female The best way to live healthy is to have a lifestyle where you eat a well-balanced diet, exercise regularly, limit alcohol use, and quit all forms of tobacco/nicotine, if applicable. Regular preventive services are another way to keep healthy. Preventive services (vaccines, screening tests, monitoring & exams) can help personalize your care plan, which helps you manage your own care. Screening tests can find health problems at the earliest stages, when they are easiest to treat. [facility] follows the current, evidence-based guidelines published by the [facility] ([facility]) when recommending preventive services for our patients. Because we follow these guidelines, sometimes recommendations change over time as research supports it. (For example, mammograms used to be recommended annually. Even though Medicare will still pay for an annual mammogram, the newer guidelines recommend a mammogram every two years for women of average risk). Of course, you and your doctor may decide to screen more often for some diseases, based on your risk and your co-morbidities (chronic disease you are already diagnosed with). Preventive services for you include: - Medicare offers their members a free annual wellness visit, which is time for you and your primary care provider to discuss and plan for your preventive service needs. Take advantage of this benefit every year! 
-All adults over the age of 72 should receive the recommended pneumonia vaccines. Current USPSTF guidelines recommend a series of two vaccines for the best pneumonia protection.  
-All adults should have a flu vaccine yearly and a tetanus vaccine every 10 years.  
-All adults age 48 and older should receive the shingles vaccines (series of two vaccines). -All adults age 38-68 who are overweight should have a diabetes screening test once every three years. -All adults born between [birth year] and 1967 should be screened once for Hepatitis C. 
-Other screening tests and preventive services for persons with diabetes include: an eye exam to screen for diabetic retinopathy, a kidney function test, a foot exam, and stricter control over your cholesterol.  
-Cardiovascular screening for adults with routine risk involves an electrocardiogram (ECG) at intervals determined by your doctor.  
-Colorectal cancer screenings should be done for adults age 54-65 with no increased risk factors for colorectal cancer. There are a number of acceptable methods of screening for this type of cancer. Each test has its own benefits and drawbacks. Discuss with your doctor what is most appropriate for you during your annual wellness visit. The different tests include: colonoscopy (considered the best screening method), a fecal occult blood test, a fecal DNA test, and sigmoidoscopy. 
 
-A bone mass density test is recommended when a woman turns 65 to screen for osteoporosis. This test is only recommended one time, as a screening. Some providers will use this same test as a disease monitoring tool if you already have osteoporosis. -Breast cancer screenings are recommended every other year for women of normal risk, age 54-69. 
-Cervical cancer screenings for women over age 72 are only recommended with certain risk factors. Here is a list of your current Health Maintenance items (your personalized list of preventive services) with a due date: There are no preventive care reminders to display for this patient.

## 2019-11-06 NOTE — PROGRESS NOTES
This is the Subsequent Medicare Annual Wellness Exam, performed 12 months or more after the Initial AWV or the last Subsequent AWV    I have reviewed the patient's medical history in detail and updated the computerized patient record.      History     Patient Active Problem List   Diagnosis Code    Carotid artery stenosis I65.29    Diabetes type 2, controlled ([address] E11.9    HLD (hyperlipidemia) E78.5    Essential hypertension I10    PAD (peripheral artery disease) ([facility]) I73.9    Rheumatoid arthritis ([address] M06.9    S/P insertion of iliac artery stent Z95.828    Cataract H26.9    Hyponatremia E87.1    Diabetic polyneuropathy associated with type 2 diabetes mellitus ([address] E11.42    Adjustment disorder with anxious mood F43.22    Diverticulosis of large intestine without hemorrhage K57.30    Mild pulmonary hypertension ([facility]) I27.20    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    Subclavian steal syndrome G45.8    Seizure ([facility]) R56.9    Cancer of head and neck ([facility]) C76.0     Past Medical History:   Diagnosis Date    Arthritis     Cancer ([address]     Oropharyngeal cancer    Carotid artery stenosis      2010 LEFT ARTERY    Cataract     Diabetes type 2, controlled ([address] 1999    diet controled    HLD (hyperlipidemia)     Hypertension     Imbalance     Numbness and tingling of left leg     thigh and foot    PAD (peripheral artery disease) ([address]     FEM--POP BYPASS 2010 LEFT FEM BYPASS RIGHT FEMORAL ENDARTERECTOMY, LEFT FEM STENT  2017    Rheumatoid arthritis ([address]     S/P insertion of iliac artery stent 2016      Past Surgical History:   Procedure Laterality Date    HX CAROTID ENDARTERECTOMY Left     HX CAROTID ENDARTERECTOMY Right     HX FEMORAL BYPASS      stent , left    HX OTHER SURGICAL      Lt cartiud artert 2010    HX OTHER SURGICAL      rt fermoral ectomy     HX VASCULAR STENT       Current Outpatient Medications   Medication Sig Dispense Refill    levETIRAcetam (KEPPRA) 500 mg tablet Take 1 Tab by mouth two (2) times a day. 30 Tab 3    aspirin (ASPIRIN) 325 mg tablet Take 1 Tab by mouth daily. 30 Tab 3    clonazePAM (KLONOPIN) 0.5 mg tablet TAKE ONE TABLET BY MOUTH TWO TIMES A DAY 60 Tab 0    lisinopril (PRINIVIL, ZESTRIL) 2.5 mg tablet Take 1 Tab by mouth two (2) times a day. 180 Tab 2    DULoxetine (CYMBALTA) 20 mg capsule Take 1 Cap by mouth daily. 90 Cap 2    clotrimazole (MYCELEX) 10 mg rosanna Take 1 Tab by mouth daily. 90 Tab 3    ondansetron (ZOFRAN ODT) 8 mg disintegrating tablet DISSOLVE 1 TABLET IN MOUTH AND SWALLOW 3 TIMES A DAY AS NEEDED FOR NAUSEA/VOMITING  3    verapamil ER (VERELAN PM) 100 mg capsule Take 1 Cap by mouth nightly for 180 days. 90 Cap 1    fluticasone propionate (FLONASE NA) by Nasal route.  hydroxychloroquine (PLAQUENIL) 200 mg tablet Take 200 mg by mouth daily.  atorvastatin (LIPITOR) 80 mg tablet Take 80 mg by mouth daily.        Allergies   Allergen Reactions    Hydrochlorothiazide Other (comments)     LOW SODIUM    Penicillin G Hives and Rash    Sulfa (Sulfonamide Antibiotics) Hives and Rash       Family History   Problem Relation Age of Onset    Diabetes Mother     Colon Polyps Other         ADENOMA IN DAUGHTER    Diabetes Brother     Diabetes Sister     Diabetes Brother     Breast Cancer Neg Hx     Colon Cancer Neg Hx      Social History     Tobacco Use    Smoking status: Former Smoker     Packs/day: 0.25     Years: 40.00     Pack years: 10.00     Types: Cigarettes    Smokeless tobacco: Never Used    Tobacco comment: STARTED 23    Substance Use Topics    Alcohol use: Yes     Comment: 2-3 X PE WEEK 2 SHOTS       Depression Risk Factor Screening:     3 most recent PHQ Screens 11/5/2019   PHQ Not Done Active Diagnosis of Depression or Bipolar Disorder   Little interest or pleasure in doing things Not at all   Feeling down, depressed, irritable, or hopeless Not at all   Total Score PHQ 2 0   Trouble falling or staying asleep, or sleeping too much More than half the days   Feeling tired or having little energy Nearly every day   Poor appetite, weight loss, or overeating Nearly every day   Feeling bad about yourself - or that you are a failure or have let yourself or your family down Not at all   Trouble concentrating on things such as school, work, reading, or watching TV Not at all   Moving or speaking so slowly that other people could have noticed; or the opposite being so fidgety that others notice More than half the days   Thoughts of being better off dead, or hurting yourself in some way Not at all   PHQ 9 Score 10   How difficult have these problems made it for you to do your work, take care of your home and get along with others -       Alcohol Risk Factor Screening:   Do you average 1 drink per night or more than 7 drinks a week:  Yes and has been discussed at this visit     On any one occasion in the past three months have you have had more than 3 drinks containing alcohol:    Yes     Functional Ability and Level of Safety:   Hearing:  Good hearing on whisper testing , 2 feel behind the patient. Activities of Daily Living: The home contains: Denies any difficulty with any indoor household activities         Ambulation: able to ambulate by herself     Fall Risk:  Fall Risk Assessment, last 12 mths 11/5/2019   Able to walk? Yes   Fall in past 12 months?  No   Fall with injury? -   Number of falls in past 12 months -   Fall Risk Score -       Abuse Screen:  Is not abused    Cognitive Screening   Has your family/caregiver stated any concerns about your memory:  Yes ,patient reports issues with short term memory loss      Patient Care Team   Patient Care Team:  Blanquita Currie MD as PCP - General (Family Practice)  Blanquita Currie MD as PCP - [facility]  Adrienne Weller MD as Physician (Rheumatology)  Yan Lau MD as Physician (Otolaryngology)  Yeimy Appiah MD as Physician (Radiation Oncology)  Nesha Page MD as Physician (Internal Medicine)  Vance Jain RN as Care Transitions Nurse (Internal Medicine)    Assessment/Plan   Education and counseling provided:  Has been provided in plan     Diagnoses and all orders for this visit:    1. Seizure disorder ([facility]    2. Essential hypertension    3. MDD (major depressive disorder), recurrent episode, moderate ([facility]    4. Medicare annual wellness visit, subsequent        There are no preventive care reminders to display for this patient. Subsequent medicare wellness visit:    1) Please make sure you have a routine physical exam every 1-2 years. 2) Annual check up with eye doctor and dentist.  3) Annual mammograms for all females starting at age of 36.  3) Self breast exam every month starting at age of 21 and above. 5) Clinical breast exam to be done every 3 years for woman between 20-30 and every year for all woman 36 and above. 6) Pap smear every 3 years starting at age 24( between 24- 27 it may be more often). At the age of 27 to 72  can switch to every 5 years with HPV screening. Woman over the age of 72 with regular cervical cancer testing with normal results no longer need testing. 7) Colorectal cancer screening with colonoscopy every ten years. 8) Bone density testing starting at the age of 72.   5) Routine blood work to be ordered as part of physical exam and has been discussed with patient. 10) Screening for STD's/HIV. 11) Exercise at least 30 min 3-5 times a week for goal BMI of less than or equal to 25.  12) Please make sure you wear a seat belt while driving daily , helmet safety discussed. 13) Please avoid smoking , alcohol and illicit drug use.   14) Daily requirement of calcium is 1200 mg per day and 1000 IU of vitamin D.  15) Please make sure all immunizations are up to date:       - Influenza vaccine every year        - Tdap every 10 years       - Pneumococcal vaccine starting at age of 72       - Shingles at age 61

## 2019-11-07 ENCOUNTER — TELEPHONE (OUTPATIENT)
Dept: FAMILY MEDICINE CLINIC | Age: 64
End: 2019-11-07

## 2019-11-07 NOTE — TELEPHONE ENCOUNTER
Pt' home health nurse, Jesus, stated pt tripped a fell lat night on her lft shoulder. There is no pain, no swelling & has full range of motion however there is a 1x2cm pink spot.

## 2019-11-21 ENCOUNTER — TELEPHONE (OUTPATIENT)
Dept: FAMILY MEDICINE CLINIC | Age: 64
End: 2019-11-21

## 2019-11-21 NOTE — TELEPHONE ENCOUNTER
\"Jet,\" home health nurse, called to relay some BP readings:19 11/19:  122/66  11/20:  161/74  (before meds)  125/63  (after meds)  11/21:  163/78   (before meds)  144/68 (after meds)

## 2019-12-17 ENCOUNTER — PATIENT OUTREACH (OUTPATIENT)
Dept: CASE MANAGEMENT | Age: 64
End: 2019-12-17

## 2019-12-17 NOTE — PROGRESS NOTES
Patient has graduated from the Transitions of Care Coordination  program on 12/17/19. Contacted patient for transition of care follow up. Patient reported no further seizure activity. Being weaned off Keppra ( taking 0.5 tba in the AM 0.5 tab in the evening). Has follow up MRI scheduled for 1/2/20. Denied any issues, needs or concerns at this time. Patient's symptoms are stable at this time. Patient/family has the ability to self-manage. Care management goals have been completed at this time. No further care transitions nurse follow up scheduled. Goals Addressed                 This Visit's Progress     COMPLETED: Attends follow-up appointments as directed. Ensure provider appt is scheduled within 7 days post-discharge; 11/1: AME appt scheduled for 11/5/19. Confirm patient attended post-discharge provider appt; Patient attended [facility] appt with PCP on 11/5/19 as scheduled. Complete post-visit call to confirm attendance and update care needs; Done                Patient has care transitions nurse contact information for any further questions, concerns, or needs.   Patient's upcoming visits:    Future Appointments   Date Time Provider Omar Saldana   1/2/2020  9:30 AM [address] [place] [address] [id number] Edgarbrant Hadd[address]   2/5/2020  8:30 AM Jodie Louise MD [address] [place] [address]

## 2019-12-23 DIAGNOSIS — F41.9 ANXIETY: ICD-10-CM

## 2019-12-30 NOTE — TELEPHONE ENCOUNTER
Requested Prescriptions     Pending Prescriptions Disp Refills    clonazePAM (KLONOPIN) 0.5 mg tablet [Pharmacy Med Name: clonazePAM 0.5 MG TABLET] 60 Tab 0     Sig: TAKE ONE TABLET BY MOUTH TWICE A DAY

## 2019-12-31 RX ORDER — CLONAZEPAM 0.5 MG/1
TABLET ORAL
Qty: 60 TAB | Refills: 0 | Status: SHIPPED | OUTPATIENT
Start: 2019-12-31 | End: 2021-04-05

## 2019-12-31 RX ORDER — CLONAZEPAM 0.5 MG/1
TABLET ORAL
Qty: 60 TAB | Refills: 0 | Status: SHIPPED | OUTPATIENT
Start: 2019-12-31 | End: 2019-12-31 | Stop reason: SDUPTHER

## 2019-12-31 NOTE — TELEPHONE ENCOUNTER
Melba- I will refill the prescription for the patient. I can send electronically. We will shred the prescription that was printed at our office. Gertrude Ramirez- can you call the patient to let her know that I sent the prescription to her pharmacy. Thank you. Diana Sharma PA-C  [facility]. [phone]  [address]

## 2019-12-31 NOTE — TELEPHONE ENCOUNTER
Daughter called very, very upset that her mother has been w/o her Klonopin for several days and had a seizure. Please refill asap.

## 2020-03-04 ENCOUNTER — PATIENT OUTREACH (OUTPATIENT)
Dept: CASE MANAGEMENT | Age: 65
End: 2020-03-04

## 2020-03-04 RX ORDER — CALCIUM CARBONATE 500(1250)
TABLET ORAL DAILY
COMMUNITY

## 2020-03-04 RX ORDER — VERAPAMIL HYDROCHLORIDE 100 MG/1
CAPSULE, EXTENDED RELEASE ORAL
COMMUNITY

## 2020-03-04 NOTE — PROGRESS NOTES
Complex Case Management      Date/Time:  3/4/2020 11:30 AM    Method of communication with patient:phone    [facility] ([facility]) contacted the patient by telephone to perform Ambulatory Care Coordination. Verified name and  (PHI) with patient as identifiers. Provided introduction to self, and explanation of the Ambulatory Care Manager's role. Reviewed most recent clinic visit w/ patient who verbalized understanding. Patient given an opportunity to ask questions. Top Challenges reviewed with the patient   1. Patient decreasing Klonopin dosage per physician order  2. Patient reports no recent seizure activity      The patient agrees to contact the PCP office or the Ambulatory Care Manager for questions related to their healthcare. Provided contact information for future reference. Disease Specific:   N/A    Home Health Active: No    DME Active: No    Barriers to care? None noted at present. Patient's daughter assists as needed    Advance Care Planning:   Does patient have an Advance Directive:  on file. Daughter is MPOA     Medication(s):   Medication reconciliation was performed with daughter, Daniel Cohen (per patient request), who verbalizes understanding of administration of home medications. There were no barriers to obtaining medications identified at this time. Referral to Pharm D needed: no     Current Outpatient Medications   Medication Sig    verapamil ER (VERELAN PM) 100 mg capsule Take  by mouth nightly.  multivit-min/iron/folic/lutein (CENTRUM SILVER WOMEN PO) Take  by mouth.  calcium carbonate (OS-CLAIRE) 500 mg calcium (1,250 mg) tablet Take  by mouth daily.     clonazePAM (KLONOPIN) 0.5 mg tablet TAKE ONE TABLET BY MOUTH TWICE A DAY (Patient taking differently: TAKE 0.5 TABLET BY MOUTH TWICE A DAY X 2 WEEKS  3/9/20 - BEGIN 0.25 TABLET TWICE A DAY X 2 WEEKS  3/23/20 BEGIN 0.25 TABLET DAILY X 2 WEEKS  THEN STOP)    levETIRAcetam (KEPPRA) 500 mg tablet Take 1 Tab by mouth two (2) times a day.  aspirin (ASPIRIN) 325 mg tablet Take 1 Tab by mouth daily.  lisinopril (PRINIVIL, ZESTRIL) 2.5 mg tablet Take 1 Tab by mouth two (2) times a day.  DULoxetine (CYMBALTA) 20 mg capsule Take 1 Cap by mouth daily.  hydroxychloroquine (PLAQUENIL) 200 mg tablet Take 200 mg by mouth daily.  atorvastatin (LIPITOR) 80 mg tablet Take 80 mg by mouth daily.  ondansetron (ZOFRAN ODT) 4 mg disintegrating tablet Take 1-2 Tabs by mouth every eight (8) hours as needed for Nausea.  clotrimazole (MYCELEX) 10 mg rosanna Take 1 Tab by mouth daily.  fluticasone propionate (FLONASE NA) by Nasal route. No current facility-administered medications for this visit. [facility] follow up appointment(s): No future appointments. Clive Neal to call [facility] and see if patient can be seen by physician at that practice.       Goals Addressed                 This Visit's Progress     Independent self-management skills        Patient will attend all physician appointments as scheduled    Patient will stay up to date on all health maintaince